# Patient Record
Sex: FEMALE | URBAN - NONMETROPOLITAN AREA
[De-identification: names, ages, dates, MRNs, and addresses within clinical notes are randomized per-mention and may not be internally consistent; named-entity substitution may affect disease eponyms.]

---

## 2017-03-13 ENCOUNTER — APPOINTMENT (RX ONLY)
Dept: URBAN - NONMETROPOLITAN AREA CLINIC 1 | Facility: CLINIC | Age: 47
Setting detail: DERMATOLOGY
End: 2017-03-13

## 2017-03-13 VITALS — HEIGHT: 64 IN | WEIGHT: 140 LBS

## 2017-03-13 DIAGNOSIS — D22 MELANOCYTIC NEVI: ICD-10-CM

## 2017-03-13 DIAGNOSIS — D485 NEOPLASM OF UNCERTAIN BEHAVIOR OF SKIN: ICD-10-CM

## 2017-03-13 DIAGNOSIS — L81.4 OTHER MELANIN HYPERPIGMENTATION: ICD-10-CM

## 2017-03-13 DIAGNOSIS — L82.1 OTHER SEBORRHEIC KERATOSIS: ICD-10-CM

## 2017-03-13 PROBLEM — D22.5 MELANOCYTIC NEVI OF TRUNK: Status: ACTIVE | Noted: 2017-03-13

## 2017-03-13 PROBLEM — D48.5 NEOPLASM OF UNCERTAIN BEHAVIOR OF SKIN: Status: ACTIVE | Noted: 2017-03-13

## 2017-03-13 PROCEDURE — ? COUNSELING

## 2017-03-13 PROCEDURE — ? BIOPSY BY SHAVE METHOD

## 2017-03-13 PROCEDURE — 99203 OFFICE O/P NEW LOW 30 MIN: CPT | Mod: 25

## 2017-03-13 PROCEDURE — 11100: CPT

## 2017-03-13 ASSESSMENT — LOCATION SIMPLE DESCRIPTION DERM
LOCATION SIMPLE: RIGHT UPPER BACK
LOCATION SIMPLE: CHEST
LOCATION SIMPLE: RIGHT LOWER BACK

## 2017-03-13 ASSESSMENT — LOCATION DETAILED DESCRIPTION DERM
LOCATION DETAILED: LEFT LATERAL SUPERIOR CHEST
LOCATION DETAILED: RIGHT MID-UPPER BACK
LOCATION DETAILED: RIGHT INFERIOR LATERAL MIDBACK

## 2017-03-13 ASSESSMENT — LOCATION ZONE DERM: LOCATION ZONE: TRUNK

## 2017-03-13 NOTE — HPI: EVALUATION OF SKIN LESION(S)
How Severe Are Your Spot(S)?: moderate
Have Your Spot(S) Been Treated In The Past?: has not been treated
Hpi Title: Evaluation of Skin Lesions
Family Member: Mother

## 2017-03-13 NOTE — PROCEDURE: BIOPSY BY SHAVE METHOD
Notification Instructions: Patient will be notified of biopsy results. However, patient instructed to call the office if not contacted within 2 weeks.
Detail Level: Detailed
Hemostasis: Drysol
Lab: [id number]
X Size Of Lesion In Cm: 0
Dressing: bandage
Biopsy Method: Jose Juan enriquez
Anesthesia Volume In Cc: 0.5
Wound Care: Bacitracin
Type Of Destruction Used: Curettage
Body Location Override (Optional - Billing Will Still Be Based On Selected Body Map Location If Applicable): Right lateral back
Consent: Written consent was obtained and risks were reviewed including but not limited to scarring, infection, bleeding, scabbing, incomplete removal, nerve damage and allergy to anesthesia.
Bill 47517 For Specimen Handling/Conveyance To Laboratory?: no
Biopsy Type: H and E
Billing Type: Third-Party Bill
Anesthesia Type: 1% lidocaine with epinephrine
Post-Care Instructions: I reviewed with the patient in detail post-care instructions. Patient is to keep the biopsy site dry overnight, and then apply bacitracin twice daily until healed. Patient may apply hydrogen peroxide soaks to remove any crusting.

## 2021-02-23 ENCOUNTER — APPOINTMENT (RX ONLY)
Dept: URBAN - METROPOLITAN AREA CLINIC 38 | Facility: CLINIC | Age: 51
Setting detail: DERMATOLOGY
End: 2021-02-23

## 2021-02-23 DIAGNOSIS — D22 MELANOCYTIC NEVI: ICD-10-CM

## 2021-02-23 DIAGNOSIS — L81.4 OTHER MELANIN HYPERPIGMENTATION: ICD-10-CM

## 2021-02-23 DIAGNOSIS — L57.8 OTHER SKIN CHANGES DUE TO CHRONIC EXPOSURE TO NONIONIZING RADIATION: ICD-10-CM

## 2021-02-23 DIAGNOSIS — Z71.89 OTHER SPECIFIED COUNSELING: ICD-10-CM

## 2021-02-23 DIAGNOSIS — D18.0 HEMANGIOMA: ICD-10-CM

## 2021-02-23 DIAGNOSIS — L82.1 OTHER SEBORRHEIC KERATOSIS: ICD-10-CM

## 2021-02-23 PROBLEM — D22.9 MELANOCYTIC NEVI, UNSPECIFIED: Status: ACTIVE | Noted: 2021-02-23

## 2021-02-23 PROBLEM — D18.01 HEMANGIOMA OF SKIN AND SUBCUTANEOUS TISSUE: Status: ACTIVE | Noted: 2021-02-23

## 2021-02-23 PROCEDURE — 99203 OFFICE O/P NEW LOW 30 MIN: CPT

## 2021-02-23 PROCEDURE — ? PRESCRIPTION

## 2021-02-23 PROCEDURE — ? COUNSELING

## 2021-02-23 RX ORDER — IMIQUIMOD 50 MG/G
CREAM TOPICAL
Qty: 1 | Refills: 3 | Status: ERX | COMMUNITY
Start: 2021-02-23

## 2021-02-23 RX ADMIN — IMIQUIMOD: 50 CREAM TOPICAL at 00:00

## 2021-02-23 ASSESSMENT — LOCATION SIMPLE DESCRIPTION DERM
LOCATION SIMPLE: ABDOMEN
LOCATION SIMPLE: RIGHT UPPER BACK

## 2021-02-23 ASSESSMENT — LOCATION ZONE DERM: LOCATION ZONE: TRUNK

## 2021-02-23 ASSESSMENT — LOCATION DETAILED DESCRIPTION DERM
LOCATION DETAILED: RIGHT INFERIOR UPPER BACK
LOCATION DETAILED: PERIUMBILICAL SKIN

## 2022-08-25 ENCOUNTER — APPOINTMENT (RX ONLY)
Dept: URBAN - METROPOLITAN AREA CLINIC 38 | Facility: CLINIC | Age: 52
Setting detail: DERMATOLOGY
End: 2022-08-25

## 2022-08-25 DIAGNOSIS — L55.9 SUNBURN, UNSPECIFIED: ICD-10-CM

## 2022-08-25 DIAGNOSIS — D18.0 HEMANGIOMA: ICD-10-CM

## 2022-08-25 DIAGNOSIS — L81.4 OTHER MELANIN HYPERPIGMENTATION: ICD-10-CM

## 2022-08-25 DIAGNOSIS — D22 MELANOCYTIC NEVI: ICD-10-CM

## 2022-08-25 DIAGNOSIS — L82.1 OTHER SEBORRHEIC KERATOSIS: ICD-10-CM

## 2022-08-25 DIAGNOSIS — B07.8 OTHER VIRAL WARTS: ICD-10-CM

## 2022-08-25 DIAGNOSIS — Z71.89 OTHER SPECIFIED COUNSELING: ICD-10-CM

## 2022-08-25 PROBLEM — D18.01 HEMANGIOMA OF SKIN AND SUBCUTANEOUS TISSUE: Status: ACTIVE | Noted: 2022-08-25

## 2022-08-25 PROBLEM — D22.9 MELANOCYTIC NEVI, UNSPECIFIED: Status: ACTIVE | Noted: 2022-08-25

## 2022-08-25 PROCEDURE — 99213 OFFICE O/P EST LOW 20 MIN: CPT

## 2022-08-25 PROCEDURE — ? COUNSELING

## 2022-08-25 PROCEDURE — ? PRESCRIPTION

## 2022-08-25 RX ORDER — IMIQUIMOD 12.5 MG/.25G
CREAM TOPICAL
Qty: 24 | Refills: 2 | Status: ERX | COMMUNITY
Start: 2022-08-25

## 2022-08-25 RX ADMIN — IMIQUIMOD: 12.5 CREAM TOPICAL at 00:00

## 2022-08-25 ASSESSMENT — LOCATION DETAILED DESCRIPTION DERM
LOCATION DETAILED: LEFT CLAVICULAR NECK
LOCATION DETAILED: LEFT DISTAL DORSAL FOREARM
LOCATION DETAILED: PERIUMBILICAL SKIN
LOCATION DETAILED: RIGHT MEDIAL TRAPEZIAL NECK
LOCATION DETAILED: RIGHT DISTAL PRETIBIAL REGION
LOCATION DETAILED: INFERIOR MID FOREHEAD
LOCATION DETAILED: RIGHT INFERIOR UPPER BACK
LOCATION DETAILED: RIGHT PROXIMAL DORSAL FOREARM
LOCATION DETAILED: RIGHT PROXIMAL PRETIBIAL REGION

## 2022-08-25 ASSESSMENT — LOCATION ZONE DERM
LOCATION ZONE: TRUNK
LOCATION ZONE: LEG
LOCATION ZONE: FACE
LOCATION ZONE: ARM
LOCATION ZONE: NECK

## 2022-08-25 ASSESSMENT — LOCATION SIMPLE DESCRIPTION DERM
LOCATION SIMPLE: RIGHT UPPER BACK
LOCATION SIMPLE: POSTERIOR NECK
LOCATION SIMPLE: LEFT FOREARM
LOCATION SIMPLE: ABDOMEN
LOCATION SIMPLE: LEFT ANTERIOR NECK
LOCATION SIMPLE: INFERIOR FOREHEAD
LOCATION SIMPLE: RIGHT FOREARM
LOCATION SIMPLE: RIGHT PRETIBIAL REGION

## 2024-01-30 ENCOUNTER — HOSPITAL ENCOUNTER (OUTPATIENT)
Dept: RADIOLOGY | Facility: MEDICAL CENTER | Age: 54
End: 2024-01-30

## 2024-02-06 ENCOUNTER — APPOINTMENT (OUTPATIENT)
Dept: SURGERY | Facility: MEDICAL CENTER | Age: 54
End: 2024-02-06

## 2024-02-08 ENCOUNTER — APPOINTMENT (RX ONLY)
Dept: URBAN - METROPOLITAN AREA CLINIC 6 | Facility: CLINIC | Age: 54
Setting detail: DERMATOLOGY
End: 2024-02-08

## 2024-02-08 DIAGNOSIS — L73.9 FOLLICULAR DISORDER, UNSPECIFIED: ICD-10-CM

## 2024-02-08 DIAGNOSIS — L57.0 ACTINIC KERATOSIS: ICD-10-CM

## 2024-02-08 DIAGNOSIS — B07.8 OTHER VIRAL WARTS: ICD-10-CM

## 2024-02-08 DIAGNOSIS — D22 MELANOCYTIC NEVI: ICD-10-CM

## 2024-02-08 DIAGNOSIS — L738 OTHER SPECIFIED DISEASES OF HAIR AND HAIR FOLLICLES: ICD-10-CM

## 2024-02-08 DIAGNOSIS — L663 OTHER SPECIFIED DISEASES OF HAIR AND HAIR FOLLICLES: ICD-10-CM

## 2024-02-08 DIAGNOSIS — L81.4 OTHER MELANIN HYPERPIGMENTATION: ICD-10-CM

## 2024-02-08 DIAGNOSIS — L82.1 OTHER SEBORRHEIC KERATOSIS: ICD-10-CM

## 2024-02-08 DIAGNOSIS — Z71.89 OTHER SPECIFIED COUNSELING: ICD-10-CM

## 2024-02-08 DIAGNOSIS — D18.0 HEMANGIOMA: ICD-10-CM

## 2024-02-08 PROBLEM — D22.5 MELANOCYTIC NEVI OF TRUNK: Status: ACTIVE | Noted: 2024-02-08

## 2024-02-08 PROBLEM — L02.223 FURUNCLE OF CHEST WALL: Status: ACTIVE | Noted: 2024-02-08

## 2024-02-08 PROBLEM — D18.01 HEMANGIOMA OF SKIN AND SUBCUTANEOUS TISSUE: Status: ACTIVE | Noted: 2024-02-08

## 2024-02-08 PROBLEM — D22.71 MELANOCYTIC NEVI OF RIGHT LOWER LIMB, INCLUDING HIP: Status: ACTIVE | Noted: 2024-02-08

## 2024-02-08 PROCEDURE — 99213 OFFICE O/P EST LOW 20 MIN: CPT | Mod: 25

## 2024-02-08 PROCEDURE — 17000 DESTRUCT PREMALG LESION: CPT

## 2024-02-08 PROCEDURE — 17003 DESTRUCT PREMALG LES 2-14: CPT

## 2024-02-08 PROCEDURE — ? PHOTO-DOCUMENTATION

## 2024-02-08 PROCEDURE — ? LIQUID NITROGEN

## 2024-02-08 PROCEDURE — ? COUNSELING

## 2024-02-08 PROCEDURE — ? ADDITIONAL NOTES

## 2024-02-08 PROCEDURE — ? SUNSCREEN RECOMMENDATIONS

## 2024-02-08 ASSESSMENT — LOCATION DETAILED DESCRIPTION DERM
LOCATION DETAILED: RIGHT INFERIOR CENTRAL MALAR CHEEK
LOCATION DETAILED: NASAL DORSUM
LOCATION DETAILED: RIGHT INFERIOR UPPER BACK
LOCATION DETAILED: SUPERIOR THORACIC SPINE
LOCATION DETAILED: INFERIOR THORACIC SPINE
LOCATION DETAILED: LEFT SUPERIOR MEDIAL UPPER BACK
LOCATION DETAILED: LEFT SUPERIOR FOREHEAD
LOCATION DETAILED: RIGHT PROXIMAL PRETIBIAL REGION
LOCATION DETAILED: NASAL SUPRATIP
LOCATION DETAILED: RIGHT MEDIAL PLANTAR MIDFOOT
LOCATION DETAILED: RIGHT DISTAL CALF
LOCATION DETAILED: RIGHT LATERAL SUPERIOR CHEST
LOCATION DETAILED: UPPER STERNUM

## 2024-02-08 ASSESSMENT — LOCATION SIMPLE DESCRIPTION DERM
LOCATION SIMPLE: LEFT FOREHEAD
LOCATION SIMPLE: LEFT UPPER BACK
LOCATION SIMPLE: RIGHT PRETIBIAL REGION
LOCATION SIMPLE: RIGHT CALF
LOCATION SIMPLE: NOSE
LOCATION SIMPLE: RIGHT CHEEK
LOCATION SIMPLE: CHEST
LOCATION SIMPLE: UPPER BACK
LOCATION SIMPLE: RIGHT UPPER BACK
LOCATION SIMPLE: RIGHT PLANTAR SURFACE

## 2024-02-08 ASSESSMENT — LOCATION ZONE DERM
LOCATION ZONE: LEG
LOCATION ZONE: TRUNK
LOCATION ZONE: NOSE
LOCATION ZONE: FACE
LOCATION ZONE: FEET

## 2024-02-08 NOTE — PROCEDURE: ADDITIONAL NOTES
Render Risk Assessment In Note?: no
Detail Level: Detailed
Additional Notes: Recommended CeraVe SA Cream
Additional Notes: Recommended Hydrocortisone, if lack of improvement will proceed with Clindamycin Solution.

## 2024-02-08 NOTE — PROCEDURE: LIQUID NITROGEN
Consent: The patient's consent was obtained including but not limited to risks of crusting, scabbing, blistering, scarring, darker or lighter pigmentary change, recurrence, incomplete removal and infection.
Show Aperture Variable?: Yes
Detail Level: Zone
Number Of Freeze-Thaw Cycles: 2 freeze-thaw cycles
Render Post-Care Instructions In Note?: no
Post-Care Instructions: I reviewed with the patient in detail post-care instructions. Patient is to wear sunprotection, and avoid picking at any of the treated lesions. Pt may apply Vaseline to crusted or scabbing areas.
Duration Of Freeze Thaw-Cycle (Seconds): 8

## 2024-02-08 NOTE — PROCEDURE: PHOTO-DOCUMENTATION
Photo Preface (Leave Blank If You Do Not Want): Photographs were obtained today
Details (Free Text): Captured clinical photos of nevus on Right superior chest, will plan on rechecking at follow up visit.
Detail Level: Detailed

## 2024-03-05 ENCOUNTER — APPOINTMENT (OUTPATIENT)
Dept: SURGERY | Facility: MEDICAL CENTER | Age: 54
End: 2024-03-05

## 2024-03-22 NOTE — PROGRESS NOTES
Shanika Christine is a 53 y.o. female who presents for evaluation of right breast papilloma.  She reports intermittent right breast pain.    Routine self breast exams: {YES/NO:20266}  Breast pain: Yes  Nipple discharge: {YES/NO:20266}  Skin changes: {YES/NO:20266}  Masses: {YES/NO:20266}  Contour/nipple changes: {YES/NO:20266}  Previous breast biopsy or surgery: Yes; Prior Lumpectomy (few years ago) for previous papilloma    Age at menarche: ***  Age at menopause: ***  Age at first birth: ***  G*** P***  Hormone replacement therapy: {YES/NO:20266}    Family history of cancer: ***    Lifetime (5-yr) breast cancer risk assessment calculation  Kristin-Kailyn v7: *** % (*** %)  Kristin-Kailyn v8: *** % (*** %)  Verónica model: *** % (*** %)    Imaging  Screening mammogram (RD) 01/18/2024: Left breast no suspicious findings. Right breast retreoareaolar cysts present. Density C.   *** ultrasound (***) ***: ***     Pathology  ***    Past Medical History   No past medical history on file.    Surgical History  No past surgical history on file.    Family History  No family history on file.    Social History  Social History     Socioeconomic History    Marital status: Not on file     Spouse name: Not on file    Number of children: Not on file    Years of education: Not on file    Highest education level: Not on file   Occupational History    Not on file   Tobacco Use    Smoking status: Not on file    Smokeless tobacco: Not on file   Substance and Sexual Activity    Alcohol use: Not on file    Drug use: Not on file    Sexual activity: Not on file   Other Topics Concern    Not on file   Social History Narrative    Not on file     Social Determinants of Health     Financial Resource Strain: Not on file   Food Insecurity: Not on file   Transportation Needs: Not on file   Physical Activity: Not on file   Stress: Not on file   Social Connections: Not on file   Intimate Partner Violence: Not on file   Housing Stability: Not on  file        Review of Systems  Review of Systems - Oncology     Objective   There were no vitals taken for this visit.   Physical Exam    Assessment & Plan   The patient is a delightful 53 y.o. female with a recurrent diagnosis of intraductal papilloma.     We discussed surgical options, including excisional biopsy of the right breast. In her particular case, I recommend proceeding with ***.   All questions answered in detail.  A thorough discussion was held with the patient of the indications, alternatives, risks (including lymphedema, positive margins, bleeding, infection, anesthetic complications, and the potential need for more than one operation), as well as the expected outcomes.  She would like to proceed with ***.     Before proceeding with ***, she will need ***.     We discussed the option of genetic counseling and genetic testing.  She *** NCCN criteria for genetic testing due to ***.  At this point the patient is *** in genetic counseling and testing.     A total of *** minutes were spent on and with this patient today, including review of records, independent review of imaging, history and physical exam, counseling, documentation of exam, and coordination of care.

## 2024-03-25 ENCOUNTER — HOSPITAL ENCOUNTER (OUTPATIENT)
Dept: RADIOLOGY | Facility: MEDICAL CENTER | Age: 54
End: 2024-03-25
Attending: NURSE PRACTITIONER

## 2024-03-25 ENCOUNTER — HOSPITAL ENCOUNTER (OUTPATIENT)
Dept: RADIOLOGY | Facility: MEDICAL CENTER | Age: 54
End: 2024-03-25

## 2024-03-25 ENCOUNTER — HOSPITAL ENCOUNTER (OUTPATIENT)
Dept: RADIOLOGY | Facility: MEDICAL CENTER | Age: 54
End: 2024-03-25
Attending: PHYSICIAN ASSISTANT

## 2024-04-16 ENCOUNTER — TELEPHONE (OUTPATIENT)
Dept: SURGERY | Facility: MEDICAL CENTER | Age: 54
End: 2024-04-16
Payer: COMMERCIAL

## 2024-04-16 NOTE — TELEPHONE ENCOUNTER
Patient called to cancel 4/18/24 new patient appointment with Dr. Merino. She said she doesn't have the money for her appointment as she had to spend it; on her sick dog. Patient did not want to reschedule appointment. Patient has rescheduled multiple times. I will notify referring office.

## 2024-04-18 ENCOUNTER — APPOINTMENT (OUTPATIENT)
Dept: SURGERY | Facility: MEDICAL CENTER | Age: 54
End: 2024-04-18

## 2024-11-20 ENCOUNTER — APPOINTMENT (RX ONLY)
Dept: URBAN - METROPOLITAN AREA CLINIC 6 | Facility: CLINIC | Age: 54
Setting detail: DERMATOLOGY
End: 2024-11-20

## 2024-11-20 DIAGNOSIS — B07.8 OTHER VIRAL WARTS: ICD-10-CM | Status: INADEQUATELY CONTROLLED

## 2024-11-20 DIAGNOSIS — L82.1 OTHER SEBORRHEIC KERATOSIS: ICD-10-CM

## 2024-11-20 DIAGNOSIS — L57.0 ACTINIC KERATOSIS: ICD-10-CM

## 2024-11-20 PROCEDURE — 99212 OFFICE O/P EST SF 10 MIN: CPT | Mod: 25

## 2024-11-20 PROCEDURE — 17003 DESTRUCT PREMALG LES 2-14: CPT | Mod: 59

## 2024-11-20 PROCEDURE — ? LIQUID NITROGEN

## 2024-11-20 PROCEDURE — ? COUNSELING

## 2024-11-20 PROCEDURE — 17000 DESTRUCT PREMALG LESION: CPT | Mod: 59

## 2024-11-20 PROCEDURE — 17110 DESTRUCTION B9 LES UP TO 14: CPT

## 2024-11-20 ASSESSMENT — LOCATION SIMPLE DESCRIPTION DERM
LOCATION SIMPLE: LEFT LOWER BACK
LOCATION SIMPLE: NOSE
LOCATION SIMPLE: RIGHT UPPER BACK
LOCATION SIMPLE: LEFT UPPER BACK
LOCATION SIMPLE: ABDOMEN
LOCATION SIMPLE: LEFT SCALP
LOCATION SIMPLE: RIGHT CHEEK

## 2024-11-20 ASSESSMENT — LOCATION ZONE DERM
LOCATION ZONE: SCALP
LOCATION ZONE: TRUNK
LOCATION ZONE: NOSE
LOCATION ZONE: FACE

## 2024-11-20 ASSESSMENT — LOCATION DETAILED DESCRIPTION DERM
LOCATION DETAILED: RIGHT SUPERIOR CENTRAL MALAR CHEEK
LOCATION DETAILED: LEFT INFERIOR LATERAL MIDBACK
LOCATION DETAILED: LEFT MEDIAL UPPER BACK
LOCATION DETAILED: LEFT MEDIAL FRONTAL SCALP
LOCATION DETAILED: RIGHT INFERIOR UPPER BACK
LOCATION DETAILED: LEFT RIB CAGE
LOCATION DETAILED: LEFT SUPERIOR LATERAL MIDBACK
LOCATION DETAILED: LEFT LATERAL ABDOMEN
LOCATION DETAILED: NASAL SUPRATIP
LOCATION DETAILED: PERIUMBILICAL SKIN
LOCATION DETAILED: RIGHT LATERAL ABDOMEN

## 2024-11-20 NOTE — PROCEDURE: LIQUID NITROGEN
Render Note In Bullet Format When Appropriate: No
Show Applicator Variable?: Yes
Number Of Freeze-Thaw Cycles: 2 freeze-thaw cycles
Duration Of Freeze Thaw-Cycle (Seconds): 8
Consent: The patient's verbal consent was obtained including but not limited to risks of crusting, scabbing, blistering, scarring, darker or lighter pigmentary change, recurrence, incomplete removal and infection.
Detail Level: Zone
Post-Care Instructions: I reviewed with the patient in detail post-care instructions. Patient is to wear sunprotection, and avoid picking at any of the treated lesions. Pt may apply Vaseline to crusted or scabbing areas.
Spray Paint Text: The liquid nitrogen was applied to the skin utilizing a spray paint frosting technique.
Consent: The patient's consent was obtained including but not limited to risks of crusting, scabbing, blistering, scarring, darker or lighter pigmentary change, recurrence, incomplete removal and infection.
Application Tool (Optional): Liquid Nitrogen Sprayer
Medical Necessity Information: It is in your best interest to select a reason for this procedure from the list below. All of these items fulfill various CMS LCD requirements except the new and changing color options.
Medical Necessity Clause: This procedure was medically necessary because the lesions that were treated were:

## 2025-02-04 ENCOUNTER — TELEPHONE (OUTPATIENT)
Dept: SURGERY | Facility: MEDICAL CENTER | Age: 55
End: 2025-02-04
Payer: COMMERCIAL

## 2025-02-04 NOTE — TELEPHONE ENCOUNTER
Referral received from JORDAN Holman. 1/22 KJ left a voicemail for patient to call back to schedule appointment. I left a voicemail today for patient to call back to schedule appointment with one of our providers.

## 2025-02-12 ENCOUNTER — TELEPHONE (OUTPATIENT)
Dept: SURGERY | Facility: MEDICAL CENTER | Age: 55
End: 2025-02-12
Payer: COMMERCIAL

## 2025-02-12 NOTE — TELEPHONE ENCOUNTER
DANY for pt to schedule a new pt consult with one of our breast surgeons. Last attempt to contact pt.     George

## 2025-02-19 ENCOUNTER — HOSPITAL ENCOUNTER (OUTPATIENT)
Dept: RADIOLOGY | Facility: MEDICAL CENTER | Age: 55
End: 2025-02-19
Attending: PHYSICIAN ASSISTANT
Payer: COMMERCIAL

## 2025-02-19 PROBLEM — D24.1 PAPILLOMA OF RIGHT BREAST: Status: ACTIVE | Noted: 2018-05-14

## 2025-02-19 PROBLEM — M25.559 ARTHRALGIA OF HIP: Status: ACTIVE | Noted: 2021-06-01

## 2025-02-19 PROBLEM — K59.00 CONSTIPATION: Status: ACTIVE | Noted: 2017-03-08

## 2025-02-19 PROBLEM — E55.9 VITAMIN D DEFICIENCY: Status: ACTIVE | Noted: 2019-03-28

## 2025-02-19 PROBLEM — L25.5 CONTACT DERMATITIS DUE TO PLANT: Status: ACTIVE | Noted: 2020-07-15

## 2025-02-19 PROBLEM — I78.1 NEVUS, NON-NEOPLASTIC: Status: ACTIVE | Noted: 2020-07-15

## 2025-02-19 PROBLEM — R92.30 DENSE BREASTS: Status: ACTIVE | Noted: 2017-03-08

## 2025-02-19 PROBLEM — M67.439 GANGLION OF WRIST: Status: ACTIVE | Noted: 2020-07-15

## 2025-02-21 ENCOUNTER — OFFICE VISIT (OUTPATIENT)
Dept: SURGERY | Facility: MEDICAL CENTER | Age: 55
End: 2025-02-21
Payer: COMMERCIAL

## 2025-02-21 VITALS
TEMPERATURE: 96.6 F | BODY MASS INDEX: 26.63 KG/M2 | DIASTOLIC BLOOD PRESSURE: 74 MMHG | WEIGHT: 156 LBS | OXYGEN SATURATION: 96 % | HEIGHT: 64 IN | SYSTOLIC BLOOD PRESSURE: 130 MMHG | HEART RATE: 77 BPM

## 2025-02-21 DIAGNOSIS — N64.52 BLOODY DISCHARGE FROM RIGHT NIPPLE: ICD-10-CM

## 2025-02-21 PROCEDURE — 3075F SYST BP GE 130 - 139MM HG: CPT | Performed by: SURGERY

## 2025-02-21 PROCEDURE — 99204 OFFICE O/P NEW MOD 45 MIN: CPT | Performed by: SURGERY

## 2025-02-21 PROCEDURE — 3078F DIAST BP <80 MM HG: CPT | Performed by: SURGERY

## 2025-02-21 ASSESSMENT — ENCOUNTER SYMPTOMS
SLEEP DISTURBANCE: 1
NECK PAIN: 1
DEPRESSION: 1
ARTHRALGIAS: 1

## 2025-02-21 NOTE — PROGRESS NOTES
Subjective     Dinah Christine is a 54 y.o. female who presents for evaluation of right bloody nipple discharge.  She reports that it has been ongoing for years, with increasing masslike density in the right lateral breast and pain/tenderness that is also worse around her periods.    Routine self breast exams: Yes  Breast pain: Yes  Nipple discharge: Yes  Skin changes: No   Masses: Yes  Contour/nipple changes: No   Previous breast biopsy or surgery: Yes (right breast UOQ excisional biopsy ~2017 ([facility]))    Age at menarche: 16  Age at menopause: pre/perimenopausal  Age at first birth: N/A    Hormone replacement therapy: Yes (combined HRT x2 weeks, ongoing)    Family history of cancer: Mother melanoma (55), breast cancer (76). MGM breast cancer (97)    Lifetime (5-yr) breast cancer risk calculations  Tyrer-Cuzick v7: 19.77% (2.97%)  Lucaser-Srinathzick v8: 19.52% (2.95%)  Verónica model: 14.25% (1.79%)    Imaging  - Bilateral screening mammogram ([facility]) 2025: No masses, distortion, or suspicious calcifications. BIRADS 2, density C.  - Bilateral breast MRI ([facility]) 01/10/2025: Right breast cysts, no suspicious findings. BIRADS 2.  All imaging personally reviewed (internal and external images).    Past Medical History   History reviewed. No pertinent past medical history.    Surgical History  Past Surgical History:   Procedure Laterality Date    LUMPECTOMY Right        Family History  History reviewed. No pertinent family history.    Social History  Social History     Socioeconomic History    Marital status: Single     Spouse name: Not on file    Number of children: Not on file    Years of education: Not on file    Highest education level: Not on file   Occupational History    Not on file   Tobacco Use    Smoking status: Former     Types: Cigarettes    Smokeless tobacco: Never   Vaping Use    Vaping status: Never Used   Substance and Sexual Activity    Alcohol use: Not Currently    Drug use: Not Currently    Sexual  "activity: Not on file   Other Topics Concern    Not on file   Social History Narrative    Not on file     Social Drivers of Health     Financial Resource Strain: Not on File (2019)    Received from [facility]    Financial Resource Strain     Financial Resource Strain: 0   Food Insecurity: Not on File (2019)    Received from [facility]    Food Insecurity     Food: 0   Transportation Needs: Not on File (2019)    Received from [facility]    Transportation Needs     Transportation: 0   Physical Activity: Not on File (2019)    Received from [facility]    Physical Activity     Physical Activity: 0   Stress: Not on File (2019)    Received from [facility]    Stress     Stress: 0   Social Connections: Not on File (2019)    Received from [facility]    Social Connections     Social Connections and Isolation: 0   Intimate Partner Violence: Not on file   Housing Stability: Not on File (2019)    Received from [facility]    Housing Stability     Housin       Review of Systems  Review of Systems   Musculoskeletal:  Positive for arthralgias and neck pain.   Psychiatric/Behavioral:  Positive for depression and sleep disturbance.    All other systems reviewed and are negative.       Objective   /74 (BP Location: Left arm, Patient Position: Sitting, BP Cuff Size: Large adult)   Pulse 77   Temp 35.9 °C (96.6 °F) (Temporal)   Ht 1.626 m (5' 4\")   Wt 70.8 kg (156 lb)   SpO2 96%   BMI 26.78 kg/m²    Physical Exam  Vitals and nursing note reviewed.   Constitutional:       General: She is not in acute distress.     Appearance: Normal appearance.   HENT:      Head: Normocephalic and atraumatic.      Right Ear: External ear normal.      Left Ear: External ear normal.      Nose: Nose normal.      Mouth/Throat:      Pharynx: Oropharynx is clear.   Eyes:      General: No scleral icterus.     Conjunctiva/sclera: Conjunctivae normal.   Cardiovascular:      Rate and Rhythm: Normal rate " and regular rhythm.      Heart sounds: Normal heart sounds. No murmur heard.     No friction rub. No gallop.   Pulmonary:      Effort: Pulmonary effort is normal. No respiratory distress.      Breath sounds: Normal breath sounds. No wheezing, rhonchi or rales.   Chest:   Breasts:     Poli Score is 5.      Breasts are symmetrical.      Right: Mass and nipple discharge present. No swelling, bleeding, inverted nipple or skin change.      Left: Normal. No swelling, bleeding, inverted nipple, mass, nipple discharge or skin change.          Comments: Bilateral breasts examined in the upright and supine positions.  No suspicious skin changes (erythema, peau d'orange).  No unexpected contour abnormalities.  Well-healed right UOQ curvilinear incision from prior excision.  Bilateral breast tissue heterogeneously dense with dominant very firm masslike nodular densities through the right breast 09:00 radian.  Bilateral nipples everted with a tiny droplet of clear-appearing fluid from a single duct on the right nipple.  No palpable cervical, supraclavicular, or axillary adenopathy bilaterally.  Bedside ultrasound performed with the GE 12mHz linear probe confirming extensive cystic changes through the 09:00 radian with the larger apparent cysts containing immobile intraluminal solid masses.  Abdominal:      General: Abdomen is flat. There is no distension.      Palpations: Abdomen is soft. There is no mass.   Musculoskeletal:         General: No swelling or deformity. Normal range of motion.      Cervical back: Neck supple.   Lymphadenopathy:      Cervical: No cervical adenopathy.      Upper Body:      Right upper body: No supraclavicular or axillary adenopathy.      Left upper body: No supraclavicular or axillary adenopathy.   Skin:     General: Skin is warm and dry.      Capillary Refill: Capillary refill takes less than 2 seconds.   Neurological:      General: No focal deficit present.      Mental Status: She is alert and  oriented to person, place, and time.   Psychiatric:         Mood and Affect: Mood normal.         Behavior: Behavior normal.         Thought Content: Thought content normal.         Judgment: Judgment normal.       [facility] ECOG Performance Status categories: 0= Fully active, able to carry on all pre-disease performance without restriction.  FUNCTIONAL ASSESSMENT  Patient responses to questions:  - Have you ever had shoulder surgery or been treated for a shoulder injury that resulted in a loss of range of motion?  No   - Are you unable to reach into an upper cabinet and retrieve a cup or plate?  No   - Do you have any limitations in daily activities because of your shoulder?  No   Overhead raise test for shoulder flexion:  Normal Range:  150-180 degrees    Assessment & Plan   The patient is a delightful 54 y.o. female with symptomatic right nipple discharge and benign imaging.  We discussed that given the sensitivity of MRI, the likelihood of missed malignancy is very low, but her ongoing symptoms warrant excision of this clearly abnormal tissue as a therapeutic and diagnostic intervention.  A thorough discussion was held with the patient of the indications, alternatives, risks (including bleeding, infection, anesthetic complications, and the potential need for more than one operation), as well as the expected outcomes.  After this discussion with shared decision-making, the patient would like to proceed with surgery scheduling for right breast excisional biopsy (likely with intraoperative ultrasound-guided wire bracketing).      The patient has the following potential barriers to care: Financial concerns. We will connect her with the appropriate financial department.    We discussed postoperative pain and that we will utilize a multimodal approach to pain control that may include preoperative medications given before surgery, intraoperative nerve blocks and local anesthetic, nonopiate pain medications during and after  surgery, and postoperative nonopiate pain medications including antiinflammatory medication and acetaminophen.  If these measures are inadequate to control her pain, opiate medications may be used on a short-term basis to aid in postoperative recovery and mobilization.  The patient does not have a history of chronic opiate use or substance abuse and has been educated about avoiding use of controlled substances with alcohol, marijuana, and/or illicit substances.  A drug utilization report will be fully reviewed prior to providing a prescription for any controlled substance if that prescription is deemed necessary.     A total of 55 minutes were spent on and with this patient today, including review of records, independent review of imaging, history and physical exam, counseling, documentation of exam, and coordination of care.

## 2025-02-26 ENCOUNTER — DOCUMENTATION (OUTPATIENT)
Dept: SURGERY | Facility: MEDICAL CENTER | Age: 55
End: 2025-02-26
Payer: COMMERCIAL

## 2025-03-10 ENCOUNTER — PRE-ADMISSION TESTING (OUTPATIENT)
Dept: ADMISSIONS | Facility: MEDICAL CENTER | Age: 55
End: 2025-03-10
Attending: SURGERY
Payer: COMMERCIAL

## 2025-03-10 NOTE — PREADMIT AVS NOTE
Current Medications   Medication Instructions    Progesterone 40 % Cream Hold medication day of procedure    Calcium-Vitamin D-Vitamin K 500-100-40 MG-UNT-MCG Chew Tab Stop 7 days before surgery

## 2025-03-19 ENCOUNTER — APPOINTMENT (OUTPATIENT)
Dept: ADMISSIONS | Facility: MEDICAL CENTER | Age: 55
End: 2025-03-19
Attending: SURGERY
Payer: COMMERCIAL

## 2025-03-19 DIAGNOSIS — Z01.812 PRE-OPERATIVE LABORATORY EXAMINATION: ICD-10-CM

## 2025-03-19 LAB
ERYTHROCYTE [DISTWIDTH] IN BLOOD BY AUTOMATED COUNT: 45 FL (ref 35.9–50)
HCT VFR BLD AUTO: 43.4 % (ref 37–47)
HGB BLD-MCNC: 14.3 G/DL (ref 12–16)
MCH RBC QN AUTO: 30.9 PG (ref 27–33)
MCHC RBC AUTO-ENTMCNC: 32.9 G/DL (ref 32.2–35.5)
MCV RBC AUTO: 93.7 FL (ref 81.4–97.8)
PLATELET # BLD AUTO: 336 K/UL (ref 164–446)
PMV BLD AUTO: 8.8 FL (ref 9–12.9)
RBC # BLD AUTO: 4.63 M/UL (ref 4.2–5.4)
WBC # BLD AUTO: 6.4 K/UL (ref 4.8–10.8)

## 2025-03-19 PROCEDURE — 85027 COMPLETE CBC AUTOMATED: CPT

## 2025-03-19 PROCEDURE — 36415 COLL VENOUS BLD VENIPUNCTURE: CPT

## 2025-03-31 ENCOUNTER — OFFICE VISIT (OUTPATIENT)
Dept: SURGERY | Facility: MEDICAL CENTER | Age: 55
End: 2025-03-31
Payer: COMMERCIAL

## 2025-03-31 VITALS
OXYGEN SATURATION: 96 % | HEART RATE: 66 BPM | WEIGHT: 158 LBS | SYSTOLIC BLOOD PRESSURE: 133 MMHG | DIASTOLIC BLOOD PRESSURE: 77 MMHG | HEIGHT: 64 IN | TEMPERATURE: 96.4 F | BODY MASS INDEX: 26.98 KG/M2

## 2025-03-31 DIAGNOSIS — N64.52 BLOODY DISCHARGE FROM RIGHT NIPPLE: ICD-10-CM

## 2025-03-31 ASSESSMENT — ENCOUNTER SYMPTOMS
CONSTITUTIONAL NEGATIVE: 1
GASTROINTESTINAL NEGATIVE: 1
RESPIRATORY NEGATIVE: 1
EYES NEGATIVE: 1
PSYCHIATRIC NEGATIVE: 1
MUSCULOSKELETAL NEGATIVE: 1
CARDIOVASCULAR NEGATIVE: 1
NEUROLOGICAL NEGATIVE: 1

## 2025-03-31 NOTE — PROGRESS NOTES
"         SUBJECTIVE:   Dinah Christine is a delightful 54 y.o. female who presents for pre-op visit and H&P update for known Right nipple discharge. She will be undergoing a Right excisional biopsy on 04/03/2025. Currently, she reports no new breast symptoms or changes to her health since last visit. There are no changes in her functional status and she is overall doing well.      Review of Systems   Constitutional: Negative.    HENT: Negative.     Eyes: Negative.    Respiratory: Negative.     Cardiovascular: Negative.    Gastrointestinal: Negative.    Genitourinary: Negative.    Musculoskeletal: Negative.    Skin: Negative.    Neurological: Negative.    Endo/Heme/Allergies: Negative.    Psychiatric/Behavioral: Negative.          OBJECTIVE:  /77 (BP Location: Left arm, Patient Position: Sitting, BP Cuff Size: Large adult)   Pulse 66   Temp (!) 35.8 °C (96.4 °F) (Temporal)   Ht 1.626 m (5' 4\")   Wt 71.7 kg (158 lb)   LMP 02/03/2025 (Approximate)   SpO2 96%   BMI 27.12 kg/m²    General: Alert, oriented, pleasant, no acute distress.  HEENT: Extraocular movements intact, no scleral icterus or conjunctival injection.  Lung: Respirations unlabored on room air. Lung sounds clear in all fields.  Cardio: Normal rate and rhythm. Heart sounds normal.  Abdomen: Soft, nondistended.  Extremities: Warm, well-perfused.  Breasts: Bilateral breasts examined in the upright and supine positions. No suspicious skin findings on either side (erythema, peau d'orange).  Bilateral breasts are heterogeneously dense with multiple dominant firm mass-like nodular densities in the right breast 9:00 position. Bilateral nipples everted without expressible discharge.  No unexpected contour abnormalities. No palpable cervical, supraclavicular, or axillary adenopathy.     FUNCTIONAL ASSESSMENT  Patient responses to questions:    Have you ever had shoulder surgery or been treated for a shoulder injury that resulted in a loss of " range of motion?  No     Are you unable to reach into an upper cabinet and retrieve a cup or plate?  No     Do you have any limitations in daily activities because of your shoulder?  No     Overhead raise test for shoulder flexion:  Normal Range:  150-180 degrees     [facility] ECOG Performance Status categories: 0= Fully active, able to carry on all pre-disease performance without restriction.    ASSESSMENT AND PLAN:  Delightful 54 y.o. female, here for pre-op visit.   Currently she is doing well, without any changes to her medical record. We discussed the plan for Right excisional biopsy. She agrees and elects to proceed with surgery as scheduled.  Pre-op labs and EKG reviewed. We discussed in depth surgical and postoperative expectations, including activity restrictions, dressing management, pain management, etc. Post-operative instructions provided at the consultation with an expectation of additional instructions day of surgery. She is aware to be NPO after midnight the night prior to surgery, aside from 16oz of water 3 hours prior, and stop necessary medications according to preop recommendations. We discussed that her pathology results will be available via [facility], however, Dr. Merino will review the results in detail at her postoperative appointment and answer any questions regarding the results at that time. We discussed that alternating Tylenol and Ibuprofen every 3-6 hours should be sufficient for pain management. All questions have been answered in detail.     Post-op visit: 04/10/2025 at 11:40am      Problem   Bloody Discharge From Right Nipple    Right bloody nipple discharge  - BIRADS 2 MRI 01/2025, sMMG 02/2025  - Extensive cystic change through the right lateral breast with apparent intraluminal solid masses  - Right Excisional biopsy 04/03/2025 ([facility])          Cancer Staging   No matching staging information was found for the patient.       External imaging and reports were independently reviewed.  I  spent 30 minutes today preparing to see the patient (including chart preparation and review), obtaining and reviewing additional medical history, performing a physical exam and evaluation, documenting clinical information in the electronic health record, independently interpreting results, communicating results to the patient, and coordinating care.     Reyna oWrley PA-C

## 2025-04-03 ENCOUNTER — APPOINTMENT (OUTPATIENT)
Dept: RADIOLOGY | Facility: MEDICAL CENTER | Age: 55
End: 2025-04-03
Attending: SURGERY
Payer: COMMERCIAL

## 2025-04-03 ENCOUNTER — ANESTHESIA EVENT (OUTPATIENT)
Dept: SURGERY | Facility: MEDICAL CENTER | Age: 55
End: 2025-04-03
Payer: COMMERCIAL

## 2025-04-03 ENCOUNTER — ANESTHESIA (OUTPATIENT)
Dept: SURGERY | Facility: MEDICAL CENTER | Age: 55
End: 2025-04-03
Payer: COMMERCIAL

## 2025-04-03 ENCOUNTER — PHARMACY VISIT (OUTPATIENT)
Dept: PHARMACY | Facility: MEDICAL CENTER | Age: 55
End: 2025-04-03
Payer: COMMERCIAL

## 2025-04-03 ENCOUNTER — HOSPITAL ENCOUNTER (OUTPATIENT)
Facility: MEDICAL CENTER | Age: 55
End: 2025-04-03
Attending: SURGERY | Admitting: SURGERY
Payer: COMMERCIAL

## 2025-04-03 VITALS
BODY MASS INDEX: 25.93 KG/M2 | TEMPERATURE: 97.6 F | WEIGHT: 151.9 LBS | SYSTOLIC BLOOD PRESSURE: 134 MMHG | OXYGEN SATURATION: 99 % | DIASTOLIC BLOOD PRESSURE: 60 MMHG | HEIGHT: 64 IN | RESPIRATION RATE: 20 BRPM | HEART RATE: 66 BPM

## 2025-04-03 DIAGNOSIS — G89.18 POSTOPERATIVE PAIN: ICD-10-CM

## 2025-04-03 DIAGNOSIS — N64.52 NIPPLE DISCHARGE: ICD-10-CM

## 2025-04-03 LAB
HCG UR QL: NEGATIVE
PATHOLOGY CONSULT NOTE: NORMAL

## 2025-04-03 PROCEDURE — 160048 HCHG OR STATISTICAL LEVEL 1-5: Performed by: SURGERY

## 2025-04-03 PROCEDURE — 700101 HCHG RX REV CODE 250: Performed by: ANESTHESIOLOGY

## 2025-04-03 PROCEDURE — 76098 X-RAY EXAM SURGICAL SPECIMEN: CPT | Mod: AS,26

## 2025-04-03 PROCEDURE — 19125 EXCISION BREAST LESION: CPT | Mod: AS,RT

## 2025-04-03 PROCEDURE — 76098 X-RAY EXAM SURGICAL SPECIMEN: CPT | Mod: RT

## 2025-04-03 PROCEDURE — 160035 HCHG PACU - 1ST 60 MINS PHASE I: Performed by: SURGERY

## 2025-04-03 PROCEDURE — 160028 HCHG SURGERY MINUTES - 1ST 30 MINS LEVEL 3: Performed by: SURGERY

## 2025-04-03 PROCEDURE — 19286 PERQ DEV BREAST ADD US IMAG: CPT | Mod: AS,RT

## 2025-04-03 PROCEDURE — 160015 HCHG STAT PREOP MINUTES: Performed by: SURGERY

## 2025-04-03 PROCEDURE — 76098 X-RAY EXAM SURGICAL SPECIMEN: CPT | Mod: 26 | Performed by: SURGERY

## 2025-04-03 PROCEDURE — 19286 PERQ DEV BREAST ADD US IMAG: CPT | Mod: RT | Performed by: SURGERY

## 2025-04-03 PROCEDURE — 700105 HCHG RX REV CODE 258: Performed by: SURGERY

## 2025-04-03 PROCEDURE — 81025 URINE PREGNANCY TEST: CPT

## 2025-04-03 PROCEDURE — 700111 HCHG RX REV CODE 636 W/ 250 OVERRIDE (IP): Performed by: ANESTHESIOLOGY

## 2025-04-03 PROCEDURE — 160002 HCHG RECOVERY MINUTES (STAT): Performed by: SURGERY

## 2025-04-03 PROCEDURE — 160009 HCHG ANES TIME/MIN: Performed by: SURGERY

## 2025-04-03 PROCEDURE — 88307 TISSUE EXAM BY PATHOLOGIST: CPT | Mod: 26 | Performed by: PATHOLOGY

## 2025-04-03 PROCEDURE — 160025 RECOVERY II MINUTES (STATS): Performed by: SURGERY

## 2025-04-03 PROCEDURE — A4648 IMPLANTABLE TISSUE MARKER: HCPCS | Performed by: SURGERY

## 2025-04-03 PROCEDURE — 19125 EXCISION BREAST LESION: CPT | Mod: RT | Performed by: SURGERY

## 2025-04-03 PROCEDURE — 19285 PERQ DEV BREAST 1ST US IMAG: CPT | Mod: AS,RT

## 2025-04-03 PROCEDURE — 88307 TISSUE EXAM BY PATHOLOGIST: CPT | Performed by: PATHOLOGY

## 2025-04-03 PROCEDURE — RXMED WILLOW AMBULATORY MEDICATION CHARGE

## 2025-04-03 PROCEDURE — 160046 HCHG PACU - 1ST 60 MINS PHASE II: Performed by: SURGERY

## 2025-04-03 PROCEDURE — 19285 PERQ DEV BREAST 1ST US IMAG: CPT | Mod: RT | Performed by: SURGERY

## 2025-04-03 PROCEDURE — 160039 HCHG SURGERY MINUTES - EA ADDL 1 MIN LEVEL 3: Performed by: SURGERY

## 2025-04-03 PROCEDURE — 700111 HCHG RX REV CODE 636 W/ 250 OVERRIDE (IP): Mod: JZ | Performed by: SURGERY

## 2025-04-03 RX ORDER — LIDOCAINE AND PRILOCAINE 25; 25 MG/G; MG/G
CREAM TOPICAL ONCE
Status: DISCONTINUED | OUTPATIENT
Start: 2025-04-03 | End: 2025-04-03 | Stop reason: HOSPADM

## 2025-04-03 RX ORDER — LIDOCAINE HYDROCHLORIDE 20 MG/ML
INJECTION, SOLUTION EPIDURAL; INFILTRATION; INTRACAUDAL; PERINEURAL PRN
Status: DISCONTINUED | OUTPATIENT
Start: 2025-04-03 | End: 2025-04-03 | Stop reason: SURG

## 2025-04-03 RX ORDER — KETOROLAC TROMETHAMINE 15 MG/ML
INJECTION, SOLUTION INTRAMUSCULAR; INTRAVENOUS PRN
Status: DISCONTINUED | OUTPATIENT
Start: 2025-04-03 | End: 2025-04-03 | Stop reason: SURG

## 2025-04-03 RX ORDER — TRAMADOL HYDROCHLORIDE 50 MG/1
50 TABLET ORAL EVERY 6 HOURS PRN
Qty: 8 TABLET | Refills: 0 | Status: SHIPPED | OUTPATIENT
Start: 2025-04-03 | End: 2025-04-05

## 2025-04-03 RX ORDER — IPRATROPIUM BROMIDE AND ALBUTEROL SULFATE 2.5; .5 MG/3ML; MG/3ML
3 SOLUTION RESPIRATORY (INHALATION)
Status: DISCONTINUED | OUTPATIENT
Start: 2025-04-03 | End: 2025-04-03 | Stop reason: HOSPADM

## 2025-04-03 RX ORDER — SODIUM CHLORIDE, SODIUM LACTATE, POTASSIUM CHLORIDE, CALCIUM CHLORIDE 600; 310; 30; 20 MG/100ML; MG/100ML; MG/100ML; MG/100ML
INJECTION, SOLUTION INTRAVENOUS CONTINUOUS
Status: DISCONTINUED | OUTPATIENT
Start: 2025-04-03 | End: 2025-04-03 | Stop reason: HOSPADM

## 2025-04-03 RX ORDER — MIDAZOLAM HYDROCHLORIDE 1 MG/ML
INJECTION INTRAMUSCULAR; INTRAVENOUS PRN
Status: DISCONTINUED | OUTPATIENT
Start: 2025-04-03 | End: 2025-04-03 | Stop reason: SURG

## 2025-04-03 RX ORDER — ALPRAZOLAM 0.25 MG
.25-.5 TABLET ORAL
Status: DISCONTINUED | OUTPATIENT
Start: 2025-04-03 | End: 2025-04-03 | Stop reason: HOSPADM

## 2025-04-03 RX ORDER — ONDANSETRON 4 MG/1
4 TABLET, FILM COATED ORAL EVERY 8 HOURS PRN
Qty: 9 TABLET | Refills: 0 | Status: SHIPPED | OUTPATIENT
Start: 2025-04-03 | End: 2025-04-06

## 2025-04-03 RX ORDER — DIPHENHYDRAMINE HYDROCHLORIDE 50 MG/ML
25 INJECTION, SOLUTION INTRAMUSCULAR; INTRAVENOUS EVERY 6 HOURS PRN
Status: DISCONTINUED | OUTPATIENT
Start: 2025-04-03 | End: 2025-04-03 | Stop reason: HOSPADM

## 2025-04-03 RX ORDER — BUPIVACAINE HYDROCHLORIDE 2.5 MG/ML
INJECTION, SOLUTION EPIDURAL; INFILTRATION; INTRACAUDAL; PERINEURAL
Status: DISCONTINUED
Start: 2025-04-03 | End: 2025-04-03 | Stop reason: HOSPADM

## 2025-04-03 RX ORDER — OXYCODONE HCL 5 MG/5 ML
10 SOLUTION, ORAL ORAL
Status: DISCONTINUED | OUTPATIENT
Start: 2025-04-03 | End: 2025-04-03 | Stop reason: HOSPADM

## 2025-04-03 RX ORDER — HYDROMORPHONE HYDROCHLORIDE 1 MG/ML
0.5 INJECTION, SOLUTION INTRAMUSCULAR; INTRAVENOUS; SUBCUTANEOUS
Status: DISCONTINUED | OUTPATIENT
Start: 2025-04-03 | End: 2025-04-03 | Stop reason: HOSPADM

## 2025-04-03 RX ORDER — MIDAZOLAM HYDROCHLORIDE 1 MG/ML
1 INJECTION INTRAMUSCULAR; INTRAVENOUS
Status: DISCONTINUED | OUTPATIENT
Start: 2025-04-03 | End: 2025-04-03 | Stop reason: HOSPADM

## 2025-04-03 RX ORDER — ONDANSETRON 2 MG/ML
INJECTION INTRAMUSCULAR; INTRAVENOUS PRN
Status: DISCONTINUED | OUTPATIENT
Start: 2025-04-03 | End: 2025-04-03 | Stop reason: SURG

## 2025-04-03 RX ORDER — HYDROMORPHONE HYDROCHLORIDE 1 MG/ML
0.2 INJECTION, SOLUTION INTRAMUSCULAR; INTRAVENOUS; SUBCUTANEOUS
Status: DISCONTINUED | OUTPATIENT
Start: 2025-04-03 | End: 2025-04-03 | Stop reason: HOSPADM

## 2025-04-03 RX ORDER — DIPHENHYDRAMINE HYDROCHLORIDE 50 MG/ML
12.5 INJECTION, SOLUTION INTRAMUSCULAR; INTRAVENOUS
Status: DISCONTINUED | OUTPATIENT
Start: 2025-04-03 | End: 2025-04-03 | Stop reason: HOSPADM

## 2025-04-03 RX ORDER — MEPERIDINE HYDROCHLORIDE 25 MG/ML
25 INJECTION INTRAMUSCULAR; INTRAVENOUS; SUBCUTANEOUS
Status: DISCONTINUED | OUTPATIENT
Start: 2025-04-03 | End: 2025-04-03 | Stop reason: HOSPADM

## 2025-04-03 RX ORDER — DIPHENHYDRAMINE HCL 25 MG
25 TABLET ORAL EVERY 6 HOURS PRN
Status: DISCONTINUED | OUTPATIENT
Start: 2025-04-03 | End: 2025-04-03 | Stop reason: HOSPADM

## 2025-04-03 RX ORDER — LIDOCAINE HYDROCHLORIDE 10 MG/ML
INJECTION, SOLUTION EPIDURAL; INFILTRATION; INTRACAUDAL; PERINEURAL
Status: DISCONTINUED
Start: 2025-04-03 | End: 2025-04-03 | Stop reason: HOSPADM

## 2025-04-03 RX ORDER — HYDROMORPHONE HYDROCHLORIDE 1 MG/ML
0.1 INJECTION, SOLUTION INTRAMUSCULAR; INTRAVENOUS; SUBCUTANEOUS
Status: DISCONTINUED | OUTPATIENT
Start: 2025-04-03 | End: 2025-04-03 | Stop reason: HOSPADM

## 2025-04-03 RX ORDER — ONDANSETRON 2 MG/ML
4 INJECTION INTRAMUSCULAR; INTRAVENOUS
Status: DISCONTINUED | OUTPATIENT
Start: 2025-04-03 | End: 2025-04-03 | Stop reason: HOSPADM

## 2025-04-03 RX ORDER — BUPIVACAINE HYDROCHLORIDE 2.5 MG/ML
INJECTION, SOLUTION EPIDURAL; INFILTRATION; INTRACAUDAL; PERINEURAL
Status: DISCONTINUED | OUTPATIENT
Start: 2025-04-03 | End: 2025-04-03 | Stop reason: HOSPADM

## 2025-04-03 RX ORDER — OXYCODONE HCL 5 MG/5 ML
5 SOLUTION, ORAL ORAL
Status: DISCONTINUED | OUTPATIENT
Start: 2025-04-03 | End: 2025-04-03 | Stop reason: HOSPADM

## 2025-04-03 RX ORDER — CEFAZOLIN SODIUM 1 G/3ML
INJECTION, POWDER, FOR SOLUTION INTRAMUSCULAR; INTRAVENOUS PRN
Status: DISCONTINUED | OUTPATIENT
Start: 2025-04-03 | End: 2025-04-03 | Stop reason: SURG

## 2025-04-03 RX ORDER — DEXAMETHASONE SODIUM PHOSPHATE 4 MG/ML
INJECTION, SOLUTION INTRA-ARTICULAR; INTRALESIONAL; INTRAMUSCULAR; INTRAVENOUS; SOFT TISSUE PRN
Status: DISCONTINUED | OUTPATIENT
Start: 2025-04-03 | End: 2025-04-03 | Stop reason: SURG

## 2025-04-03 RX ORDER — LIDOCAINE HYDROCHLORIDE 10 MG/ML
INJECTION, SOLUTION EPIDURAL; INFILTRATION; INTRACAUDAL; PERINEURAL
Status: DISCONTINUED | OUTPATIENT
Start: 2025-04-03 | End: 2025-04-03 | Stop reason: HOSPADM

## 2025-04-03 RX ORDER — EPHEDRINE SULFATE 50 MG/ML
INJECTION, SOLUTION INTRAVENOUS PRN
Status: DISCONTINUED | OUTPATIENT
Start: 2025-04-03 | End: 2025-04-03 | Stop reason: SURG

## 2025-04-03 RX ADMIN — FENTANYL CITRATE 100 MCG: 50 INJECTION, SOLUTION INTRAMUSCULAR; INTRAVENOUS at 08:39

## 2025-04-03 RX ADMIN — FENTANYL CITRATE 50 MCG: 50 INJECTION, SOLUTION INTRAMUSCULAR; INTRAVENOUS at 09:33

## 2025-04-03 RX ADMIN — SUGAMMADEX 200 MG: 100 INJECTION, SOLUTION INTRAVENOUS at 09:40

## 2025-04-03 RX ADMIN — PROPOFOL 150 MG: 10 INJECTION, EMULSION INTRAVENOUS at 08:39

## 2025-04-03 RX ADMIN — SODIUM CHLORIDE, POTASSIUM CHLORIDE, SODIUM LACTATE AND CALCIUM CHLORIDE: 600; 310; 30; 20 INJECTION, SOLUTION INTRAVENOUS at 08:49

## 2025-04-03 RX ADMIN — EPHEDRINE SULFATE 10 MG: 50 INJECTION, SOLUTION INTRAVENOUS at 09:40

## 2025-04-03 RX ADMIN — KETOROLAC TROMETHAMINE 15 MG: 15 INJECTION, SOLUTION INTRAMUSCULAR; INTRAVENOUS at 09:46

## 2025-04-03 RX ADMIN — LIDOCAINE HYDROCHLORIDE 40 MG: 20 INJECTION, SOLUTION EPIDURAL; INFILTRATION; INTRACAUDAL; PERINEURAL at 08:39

## 2025-04-03 RX ADMIN — CEFAZOLIN 2 G: 1 INJECTION, POWDER, FOR SOLUTION INTRAMUSCULAR; INTRAVENOUS at 08:34

## 2025-04-03 RX ADMIN — ONDANSETRON 4 MG: 2 INJECTION INTRAMUSCULAR; INTRAVENOUS at 08:50

## 2025-04-03 RX ADMIN — DEXAMETHASONE SODIUM PHOSPHATE 8 MG: 4 INJECTION INTRA-ARTICULAR; INTRALESIONAL; INTRAMUSCULAR; INTRAVENOUS; SOFT TISSUE at 08:50

## 2025-04-03 RX ADMIN — MIDAZOLAM HYDROCHLORIDE 2 MG: 1 INJECTION, SOLUTION INTRAMUSCULAR; INTRAVENOUS at 08:34

## 2025-04-03 RX ADMIN — ROCURONIUM BROMIDE 40 MG: 10 INJECTION INTRAVENOUS at 08:39

## 2025-04-03 RX ADMIN — FENTANYL CITRATE 50 MCG: 50 INJECTION, SOLUTION INTRAMUSCULAR; INTRAVENOUS at 09:04

## 2025-04-03 ASSESSMENT — PAIN DESCRIPTION - PAIN TYPE
TYPE: SURGICAL PAIN

## 2025-04-03 ASSESSMENT — PAIN SCALES - GENERAL: PAIN_LEVEL: 5

## 2025-04-03 NOTE — ANESTHESIA POSTPROCEDURE EVALUATION
Patient: Dinah Christine    Procedure Summary       Date: 04/03/25 Room / Location: [facility] ROOM 27 / SURGERY SAME DAY [facility]    Anesthesia Start: 0834 Anesthesia Stop: 0956    Procedure: INTRA-OPERATIVE WIRE LOCALIZED RIGHT BREAST EXCISION BIOPSY (Right: Breast) Diagnosis: (RIGHT BLOODY NIPPLE DISCHARGE)    Surgeons: Jill Lion M.D. Responsible Provider: Aman Parkinson M.D.    Anesthesia Type: general ASA Status: 1            Final Anesthesia Type: general  Last vitals  BP   Blood Pressure: 107/56    Temp   36.2 °C (97.2 °F)    Pulse   63   Resp   16    SpO2   97 %      Anesthesia Post Evaluation    Patient location during evaluation: PACU  Patient participation: complete - patient participated  Level of consciousness: awake and alert  Pain score: 5    Airway patency: patent  Anesthetic complications: no  Cardiovascular status: hemodynamically stable  Respiratory status: acceptable  Hydration status: euvolemic    PONV: none          No notable events documented.     Nurse Pain Score: 0 (NPRS)

## 2025-04-03 NOTE — OR NURSING
0951: Pt arrived from OR to [facility]. ID verified. Report received from SHAKIRA Guerrero and Dr Parkinson. Connected to monitor. 4L O2 via mask. Pt is awake and alert. Breast binder in place, ABD and fluff noted clean dry and intact.    0957: weaned to room air. Pt states she has some soreness 3/10 right breast, but it is tolerable and does not need anything.  Tolerated sips of water.    1015: called [facility] pharmacy - medications still in process. ETA 20min.    1030: after nap, pt feeling more awake. Stated readiness to get dressed. Phase 1 complete. Mom Gavi called with updates, ETA 30 min - will meet in garage. Pt ambulated to bathroom and able to void.    1045: prescriptions picked up from pharmacy, given to patient. PIV removed with tip intact.     1120: Out to pick-up area via wheelchair accompanied by this RN. Discharge instructions reviewed with mom. Pt discharged home in stable condition. All belongings taken. Rx for tramadol and zofran with patient.

## 2025-04-03 NOTE — ANESTHESIA PREPROCEDURE EVALUATION
Case: [id number] Date/Time: 04/03/25 0845    Procedure: INTRA-OPERATIVE WIRE LOCALIZED RIGHT BREAST EXCISION BIOPSY    Pre-op diagnosis: NIPPLE DISCHARGE    Location: [facility] ROOM 27 / SURGERY SAME DAY [facility]    Surgeons: Jill Lion M.D.            Relevant Problems   CARDIAC   (positive) External hemorrhoids       Physical Exam    Airway   Mallampati: II  TM distance: >3 FB  Neck ROM: full       Cardiovascular - normal exam  Rhythm: regular  Rate: normal  (-) murmur     Dental - normal exam           Pulmonary - normal exam  Breath sounds clear to auscultation     Abdominal    Neurological - normal exam                   Anesthesia Plan    ASA 1       Plan - general       Airway plan will be ETT          Induction: intravenous    Postoperative Plan: Postoperative administration of opioids is intended.    Pertinent diagnostic labs and testing reviewed    Informed Consent:    Anesthetic plan and risks discussed with patient.    Use of blood products discussed with: patient whom consented to blood products.

## 2025-04-03 NOTE — OP REPORT
Patient Name: Dinah Christine   Medical Record Number: [id number]   Date of Service: 4/3/2025    Surgeon: Jill Lewis MD [facility]  Assistant: Reyna Worley PA-C    Operation:  Right breast ultrasound-guided wire localization x2 (bracketing wires)  Right breast wire-localized excisional biopsy  Interpretation of intraoperative specimen radiograph    Preoperative Diagnosis: Right bloody nipple discharge with extensive palpable cystic change.  Postoperative Diagnosis: Same    Anesthesia: General anesthesia via laryngeal mask airway.  Multimodal analgesia was initiated preoperatively by the anesthesia team.    Estimated Blood Loss: 10 mL    Specimen: Right breast 09:00    Complications: none    Operative Findings: Intraoperative specimen radiograph demonstrated excision and capture of the extensive lesions and both localization wires.    Indications: Dinah Christine is a 54 y.o. female who has ongoing nipple discharge, sometimes bloody, with extensive cystic change on imaging.  A thorough discussion of the indications, alternatives, risks, and benefits to excisional biopsy was held with the patient.  All questions were answered in detail.  Informed consent was signed and placed in the chart.    Description of Operation:  The patient was brought into the operating room and placed supine on the operating table.  Preoperative antibiotics were administered and sequential compression devices were placed for venous thromboembolic prophylaxis.  Smooth General anesthesia via laryngeal mask airway was induced.  Once the patient was resting comfortably the 13 MHz linear ultrasound probe was used to identify the radial distribution of the cysts in the right breast at the 9:00 position, 1 - 10 cm from the nipple.  This was clearly marked on the skin.  A preoperative timeout was performed confirming the patient's identity and the nature and location of the operation to be performed.    The right breast was  prepped with alcohol.  Under direct ultrasound visualization a 5 cm Kopans wire was introduced using a modified Seldinger technique from an inferolateral approach to lie just medial to the medial-most area of concern (retroareolar).  The wire was then trimmed.  This procedure was repeated with a second wire to the lateral aspect of the targeted area; this wire was also trimmed.    The breast was then prepped and draped in the usual sterile fashion using chlorhexidine.  The planned incision was marked on the skin in a radial elliptical fashion.  The planned incision and underlying breast tissue were anesthetized with a 1:1 mixture of 1% lidocaine plain and 0.25% bupivicaine plain as a field block.  The incision was then made sharply and continued into the subcutaneous tissue with electrocautery.  The Kopans wires were encountered at the inferior aspect of the incision and were carefully secured and brought into the incision.  The wires were then traced down and the targeted breast tissue was dissected free from the surrounding breast tissue using electrocautery.  The specimen was then oriented using CorrectClips and was submitted for specimen radiograph.  This demonstrated capture of the lesion and both localization wires in their entirety.  The specimen was then submitted to pathology.  The incision was inspected for hemostasis, which was achieved with electrocautery.  The wound was then irrigated thoroughly with warm sterile saline; the effluent was clear.  The breast tissue was then gently reapproximated using interrupted 2-0 Vicryl sutures.  The skin was closed with interrupted 3-0 Monocryl deep dermal sutures and a 4-0 Monocryl running subcuticular suture.  A sterile dressing was applied.    The patient was then awakened from General anesthesia via laryngeal mask airway and was taken to the postanesthesia care unit in stable condition.  At the conclusion of the operation all sponge, needle, and instrument counts  were reported correct x2 by the nursing staff.  Estimated blood loss was 10 mL.  The patient tolerated the procedure well with no immediate complications.  It is anticipated that the patient will be able to be discharged to home when PACU discharge criteria are met.

## 2025-04-03 NOTE — DISCHARGE INSTRUCTIONS
HOME CARE INSTRUCTIONS    ACTIVITY: Rest and take it easy for the first 24 hours.  A responsible adult is recommended to remain with you during that time.  It is normal to feel sleepy.  We encourage you to not do anything that requires balance, judgment or coordination.    FOR 24 HOURS DO NOT:  Drive, operate machinery or run household appliances.  Drink beer or alcoholic beverages.  Make important decisions or sign legal documents.    SPECIAL INSTRUCTIONS: PLEASE REFER TO DR CARRINGTON POST-OP INSTRUCTIONS    DIET: No restrictions, may resume normal diet as tolerated. To avoid nausea, slowly advance diet as tolerated, avoiding spicy or greasy foods for the first day.  Add more substantial food to your diet according to your physician's instructions.  INCREASE FLUIDS AND FIBER TO AVOID CONSTIPATION.    MEDICATIONS: Resume taking daily medication.  Take prescribed pain medication with food.  If no medication is prescribed, you may take non-aspirin pain medication if needed.  PAIN MEDICATION CAN BE VERY CONSTIPATING.  Take a stool softener or laxative such as senokot, pericolace, or milk of magnesia if needed.    Prescription: TRAMADOL (PAIN MEDICATION), ONDANSETRON (ANTI-NAUSEA MEDICATION)    Last pain medication given: NONE  Local anesthetic used right breast. May take pain medication or over-the-counter Tylenol or Ibuprofen when you feel it start to wear off.     A follow-up appointment should be arranged with Dr CARRINGTON; call to schedule.    You should CALL YOUR PHYSICIAN if you develop:  Fever greater than 101 degrees F.  Pain not relieved by medication, or persistent nausea or vomiting.  Excessive bleeding (blood soaking through dressing) or unexpected drainage from the wound.  Extreme redness or swelling around the incision site, drainage of pus or foul smelling drainage.  Inability to urinate or empty your bladder within 8 hours.  Problems with breathing or chest pain.    You should call 911 if you  develop problems with breathing or chest pain.  If you are unable to contact your doctor or surgical center, you should go to the nearest emergency room or urgent care center.      MILD FLU-LIKE SYMPTOMS ARE NORMAL.  YOU MAY EXPERIENCE GENERALIZED MUSCLE ACHES, THROAT IRRITATION, HEADACHE AND/OR SOME NAUSEA.    If any questions arise, call Dr CARRINGTON at [phone]. If your doctor is not available, please feel free to call the Surgical Center at [phone].  The Center is open Monday through Friday from 7AM to 7PM.      A registered nurse may call you a few days after your surgery to see how you are doing after your procedure.    You may also receive a survey in the mail within the next two weeks and we ask that you take a few moments to complete the survey and return it to us.  Our goal is to provide you with very good care and we value your comments.     Depression / Suicide Risk    As you are discharged from this [facility] facility, it is important to learn how to keep safe from harming yourself.    Recognize the warning signs:  Abrupt changes in personality, positive or negative- including increase in energy   Giving away possessions  Change in eating patterns- significant weight changes-  positive or negative  Change in sleeping patterns- unable to sleep or sleeping all the time   Unwillingness or inability to communicate  Depression  Unusual sadness, discouragement and loneliness  Talk of wanting to die  Neglect of personal appearance   Rebelliousness- reckless behavior  Withdrawal from people/activities they love  Confusion- inability to concentrate     If you or a loved one observes any of these behaviors or has concerns about self-harm, here's what you can do:  Talk about it- your feelings and reasons for harming yourself  Remove any means that you might use to hurt yourself (examples: pills, rope, extension cords, firearm)  Get professional help from the community (Mental Health, Substance  Abuse, psychological counseling)  Do not be alone:Call your Safe Contact- someone whom you trust who will be there for you.  Call your local CRISIS HOTLINE 073-6929 or 363-509-2416  Call your local Children's Mobile Crisis Response Team [place] (104) 599-1190 or www.[employer]  Call the toll free National Suicide Prevention Hotlines   National Suicide Prevention Lifeline 340-316-OBUI (1514)  [place] [facility] Line Network 800-SUICIDE (140-7124)    I acknowledge receipt and understanding of these Home Care instructions.

## 2025-04-03 NOTE — ANESTHESIA TIME REPORT
Anesthesia Start and Stop Event Times       Date Time Event    4/3/2025 0822 Ready for Procedure     0834 Anesthesia Start     0956 Anesthesia Stop          Responsible Staff  04/03/25      Name Role Begin End    Aman Parkinson M.D. Anesth 0834 0956          Overtime Reason:  no overtime (within assigned shift)    Comments:

## 2025-04-03 NOTE — ANESTHESIA PROCEDURE NOTES
Airway    Date/Time: 4/3/2025 8:51 AM    Performed by: Aman Parkinson M.D.  Authorized by: Aman Parkinson M.D.    Location:  OR  Urgency:  Elective  Indications for Airway Management:  Anesthesia      Spontaneous Ventilation: absent    Sedation Level:  Deep  Preoxygenated: Yes    Final Airway Type:  Supraglottic airway  Final Supraglottic Airway:  Standard LMA    SGA Size:  3  Number of Attempts at Approach:  1

## 2025-04-04 ENCOUNTER — TELEPHONE (OUTPATIENT)
Dept: SURGERY | Facility: MEDICAL CENTER | Age: 55
End: 2025-04-04
Payer: COMMERCIAL

## 2025-04-04 ENCOUNTER — RESULTS FOLLOW-UP (OUTPATIENT)
Dept: SURGERY | Facility: MEDICAL CENTER | Age: 55
End: 2025-04-04
Payer: COMMERCIAL

## 2025-04-04 NOTE — TELEPHONE ENCOUNTER
MAGDAM to see how Dinah is doing postoperatively. I reminded her of her postop appointment scheduled for 04/10/2025 at 11:40am. I advised her to continue wearing her compression binder to prevent excess swelling, pain, or bruising until her post op appointment. I informed her that she can shower beginning 48 hours after surgery.

## 2025-04-09 PROBLEM — N60.11: Status: ACTIVE | Noted: 2018-05-14

## 2025-04-10 ENCOUNTER — OFFICE VISIT (OUTPATIENT)
Dept: SURGERY | Facility: MEDICAL CENTER | Age: 55
End: 2025-04-10
Payer: COMMERCIAL

## 2025-04-10 VITALS
HEART RATE: 67 BPM | TEMPERATURE: 98.1 F | SYSTOLIC BLOOD PRESSURE: 122 MMHG | WEIGHT: 150 LBS | DIASTOLIC BLOOD PRESSURE: 70 MMHG | OXYGEN SATURATION: 97 % | BODY MASS INDEX: 25.61 KG/M2 | HEIGHT: 64 IN

## 2025-04-10 DIAGNOSIS — N60.11 DUCTAL PAPILLOMATOSIS OF RIGHT BREAST: ICD-10-CM

## 2025-04-10 DIAGNOSIS — Z91.89 AT HIGH RISK FOR BREAST CANCER: ICD-10-CM

## 2025-04-10 DIAGNOSIS — N64.52 BLOODY DISCHARGE FROM RIGHT NIPPLE: ICD-10-CM

## 2025-04-10 PROCEDURE — 99024 POSTOP FOLLOW-UP VISIT: CPT | Performed by: SURGERY

## 2025-04-10 NOTE — PROGRESS NOTES
"    Subjective    Delightful 54F s/p R breast excisional biopsy 04/03/2025, here for postop check and review of pathology.  Since surgery she reports no acute issues.  Functionally, the patient is at baseline.    Pathology:  Extensive intraductal papillomatosis, no atypia, no malignancy.  The patient was given a printed copy of her pathology report and it was discussed with her in detail.    Objective    /70 (BP Location: Left arm, Patient Position: Sitting, BP Cuff Size: Large adult)   Pulse 67   Temp 36.7 °C (98.1 °F) (Temporal)   Ht 1.626 m (5' 4\")   Wt 68 kg (150 lb)   SpO2 97%   BMI 25.75 kg/m²   Gen: Alert, oriented, pleasant, no acute distress  Chest: Respirations unlabored on room air with symmetric expansion  Abd: Soft, nondistended  Ext: Warm, well-perfused  Breasts: Right breast lateral radial incision clean, dry, intact.  No erythema.  No exudate.  No palpable masses.  No palpable fluctuance.  Moderate resolving ecchymosis.  Expected contour with lateral flattening.    [facility]G Performance Status categories: 0= Fully active, able to carry on all pre-disease performance without restriction.    Assessment & Plan    Delightful 54F s/p R breast excisional biopsy for extensive intraductal papillomatosis, overall doing very well postoperatively.  We discussed her pathology results and the next steps in detail.  Upon recalculation of her risk profile, she meets criteria for high risk surveillance and consideration of chemoprevention.  The first step here will be to stop the combined HRT.  I did place a referral to oncology wellness to discuss nonhormonal options for menopausal symptoms; we can consider chemoprevention in the future.  All questions were answered to the patient's satisfaction.   - Referrals: Oncology wellness   - Return to clinic: 1 month wound check   - Next clinical breast exam: 08/2025, 02/2026, 08/2026, 02/2027   - Next breast imaging: B dMMG 02/2026, MRI 08/2026, B sMMG " 02/2027    Problem   At High Risk for Breast Cancer    Lifetime (5-yr) breast cancer risk calculations 04/10/2025:  - Lucaser-Kailyn v7: 20.96% (3.17%)  - Lucaser-Kailyn v8: 21.64% (3.32%)  - Verónica model: 20.57% (2.68%)    High risk surveillance     Bloody Discharge From Right Nipple    Right bloody nipple discharge  - BIRADS 2 MRI 01/2025, sMMG 02/2025  - Extensive cystic change through the right lateral breast with apparent intraluminal solid masses  - Right excisional biopsy 04/03/2025 (Angelique): Extensive intraductal papillomatosis with apocrine metaplasia     Ductal Papillomatosis of Right Breast    Right breast UOQ excisional biopsy 2017 (Tian): Papilloma  Bloody nipple discharge developed 2025, significant masslike change through lateral right breast  Right breast lateral excisional biopsy 04/03/2025 (Angelique): Extensive intraductal papillomas with apocrine metaplasia.

## 2025-04-11 NOTE — Clinical Note
REFERRAL APPROVAL NOTICE         Sent on April 11, 2025                   Dinah Christine  [address] [place] [address]                   Dear Ms. Christine,    After a careful review of the medical information and benefit coverage, [facility] has processed your referral. See below for additional details.    If applicable, you must be actively enrolled with your insurance for coverage of the authorized service. If you have any questions regarding your coverage, please contact your insurance directly.    REFERRAL INFORMATION   Referral #:  [id number]  Referred-To Department    Referred-By Provider:  Surgery    Jill Lion M.D.   [facility]      [address]  [address]  [place] [address]  [phone] [address]  [place] [address] [phone]    Referral Start Date:  04/10/2025  Referral End Date:   04/10/2026             SCHEDULING  If you do not already have an appointment, please call [phone] to make an appointment.     MORE INFORMATION  If you do not already have a [employer] account, sign up at: [email or web address]  You can access your medical information, make appointments, see lab results, billing information, and more.  If you have questions regarding this referral, please contact  the [facility] Referrals department at:             [phone]. Monday - Friday 8:00AM - 5:00PM.     Sincerely,    [facility]

## 2025-04-14 ENCOUNTER — TELEPHONE (OUTPATIENT)
Dept: SURGERY | Facility: MEDICAL CENTER | Age: 55
End: 2025-04-14
Payer: COMMERCIAL

## 2025-04-17 ENCOUNTER — OFFICE VISIT (OUTPATIENT)
Dept: SURGERY | Facility: MEDICAL CENTER | Age: 55
End: 2025-04-17
Payer: COMMERCIAL

## 2025-04-17 VITALS
OXYGEN SATURATION: 98 % | WEIGHT: 149.4 LBS | HEART RATE: 87 BPM | BODY MASS INDEX: 25.51 KG/M2 | SYSTOLIC BLOOD PRESSURE: 125 MMHG | HEIGHT: 64 IN | DIASTOLIC BLOOD PRESSURE: 75 MMHG | TEMPERATURE: 98.3 F

## 2025-04-17 DIAGNOSIS — N95.1 MENOPAUSAL SYMPTOMS: ICD-10-CM

## 2025-04-17 DIAGNOSIS — N60.11 DUCTAL PAPILLOMATOSIS OF RIGHT BREAST: ICD-10-CM

## 2025-04-17 DIAGNOSIS — Z91.89 AT HIGH RISK FOR BREAST CANCER: ICD-10-CM

## 2025-04-17 PROCEDURE — 3074F SYST BP LT 130 MM HG: CPT | Performed by: OBSTETRICS & GYNECOLOGY

## 2025-04-17 PROCEDURE — 99215 OFFICE O/P EST HI 40 MIN: CPT | Performed by: OBSTETRICS & GYNECOLOGY

## 2025-04-17 PROCEDURE — 99417 PROLNG OP E/M EACH 15 MIN: CPT | Performed by: OBSTETRICS & GYNECOLOGY

## 2025-04-17 PROCEDURE — 3078F DIAST BP <80 MM HG: CPT | Performed by: OBSTETRICS & GYNECOLOGY

## 2025-04-17 NOTE — PROGRESS NOTES
Primary Care Provider Crytsal Rosen P.A.-C.   Referring Provider: Jill Lewis MD   Surgical Oncologist: Jill Lewis MD       HPI:  55 yo     Patient referred for wellness / lifestyle medicine care after recent diagnosis of extensive intraductal papillomatosis   At High Risk for Breast Cancer     Lifetime (5-yr) breast cancer risk calculations 04/10/2025:  - Lucaser-Cuzick v7: 20.96% (3.17%)  - Lucaser-Cuzick v8: 21.64% (3.32%)  - Verónica model: 20.57% (2.68%)     High risk surveillance     Referred for management of menopause symptoms     Comorbidities: BMI 25       This very delightful patient presents today to discuss management of menopausal symptoms after seeing Dr. Merino and being diagnosed as a high risk for breast cancer.  She states that she was started on a compounded estrogen and progesterone cream through a company called [employer].  This was a bioidentical product that she started in 2025.  She states before starting it she was noticing mood changes, anger issues, impatience with her family.  Felt down and depressed as well as had a very low energy level and felt exhausted all the time.  She states after starting that she immediately felt better and continued to feel better until last week when it was discontinued.  She denies hot flashes she states that she was occasionally warm at night but that was not the predominant symptom.  She denies any vaginal dryness or sexual dysfunction.  She did have some brain fog and achiness but started using some alejandra turmeric and that has improved.  She also struggles with her sleep.  She states that when she was on the hormones her sleep was significantly better but now is struggling again with some insomnia.    She states her mother had breast cancer at the age of 72 and her grandmother had it at 97 and she was recently diagnosed with extensive intraductal papillomatosis.    She states her nutrition overall is pretty good  "although she does have quite a bit of packaged processed food including Gatorade, Triscuits, tortilla chips, candy such as Snickers periodically.  Typical day involves a very protein heavy diet.  Consisting of eggs and meat and cheese with not a lot of fruits or vegetables.    From an exercise standpoint she states that she does not intentionally exercise but is very very active for work.    She does not smoke she does not use recreational drugs she will have up to 6 alcoholic beverages per week.  She states this is not every week but on some weeks she will go \"grab a beer\" with a friend or drink socially most nights of the week but then other weeks she will not.      Dinah Christine has a current medication list which includes the following prescription(s): calcium-vitamin d-vitamin k and progesterone.     Patient Active Problem List   Diagnosis    Arthralgia of hip    Dense breasts    Constipation    Contact dermatitis due to plant    External hemorrhoids    Ganglion of wrist    Nevus, non-neoplastic    Ductal papillomatosis of right breast    Vitamin D deficiency    Bloody discharge from right nipple    At high risk for breast cancer        Past Surgical History:   Procedure Laterality Date    BREAST BIOPSY Right 4/3/2025    Procedure: INTRA-OPERATIVE WIRE LOCALIZED RIGHT BREAST EXCISION BIOPSY;  Surgeon: Jill Lion M.D.;  Location: SURGERY SAME DAY [facility];  Service: General    LUMPECTOMY Right 2000    OTHER ORTHOPEDIC SURGERY Right 1990    shoulder        Social History     Tobacco Use    Smoking status: Former     Current packs/day: 0.00     Types: Cigarettes    Smokeless tobacco: Never   Vaping Use    Vaping status: Never Used   Substance Use Topics    Alcohol use: Yes     Alcohol/week: 3.0 oz     Types: 2 Cans of beer, 3 Standard drinks or equivalent per week     Comment: 3 drinks per week    Drug use: Never        Family History   Problem Relation Age of Onset    Breast Cancer " Mother     Breast Cancer Maternal Grandmother         Dinah Christine has no known allergies.     Vitals:    04/17/25 1417   BP: 125/75   Pulse: 87   Temp: 36.8 °C (98.3 °F)   SpO2: 98%     Body mass index is 25.64 kg/m².    Physical Exam  Vitals reviewed.   Constitutional:       Appearance: Normal appearance.   Skin:     General: Skin is warm.   Neurological:      General: No focal deficit present.      Mental Status: She is alert and oriented to person, place, and time.   Psychiatric:         Mood and Affect: Mood normal.         Behavior: Behavior normal.         Thought Content: Thought content normal.          1. Ductal papillomatosis of right breast    2. At high risk for breast cancer    3. Menopausal symptoms       We reviewed the goal of the oncology wellness clinic is to assist her in optimizing health, sense of well-being, reducing recurrence risk, and improving quality of life after a cancer diagnosis. In the situation of those without a cancer diagnosis but at high risk, our focus is on lifestyle focused prevention strategies. We identified the following areas which are currently identified as priorities to work on by this patient:     optimizing sleep and energy levels and mental and emotional health     Our current plan includes:   Patient was given my packet of lifestyle resources, including nutrition and physical activity information , The benefits of a more plant-forward diet, focused on a wide variety of fruits, vegetables, legumes, nuts, seeds, and lean proteins was explained. , The patient and I discussed a goal of incorporating more fiber as well as diversity, slowly increasing to 25-30 grams a day. There is also benefit to a wide diversity of fiber sources. , The patient will work toward 5-8 servings of fruits/ vegetables per day, which helps also meet fiber goals. , and The patient will try to minimize added sugar to less than 25 grams / day.     Spent most of the visit really  focused on the patient's menopausal symptoms which she notes really predominantly are the mood changes and a lack of energy.  Would be the priority to fix.  I explained that many of our off label medications we use for nonhormonal management of menopausal symptoms are really geared towards the vasomotor symptoms such as hot flashes and night sweats which she is not having.  Sure which can help with some of the mood changes associated with menopause potentially  We discussed the role of exercise to try to improve her energy level  We discussed the importance of exercise and nutrition and trying to minimize her increased risk of breast cancer  We discussed magnesium with Mahsa  to try to improve sleep as well as good sleep hygiene strategies which are on my handout which I gave her.  Also reviewed the role of acupuncture for menopause related insomnia as well  We discussed that the use of menopause hormone therapy is all about a risk versus benefit analysis year after year.  I always recommend that people try nonhormonal mechanisms and strategies to try to manage the menopausal symptoms but ultimately there are times where the patient ultimately decides that the benefits to their quality of life are worth the risk.  She understands this and wishes to try to find other ways to manage.  The other thing I brought up as far as an off label use of a pharmaceutical would be the use of bupropion.  This would provide some mood stabilization, potentially some energy effects as well as sometimes some improvement of the brain fog.  She hopes to try to get by without using any other prescription medications but is aware that this would be an option in the future if desired.  She is also going to try to minimize alcohol and make it a little more intentional knowing that this can also improve energy levels, brain fog, menopausal symptoms.  Is overall better for her risk reduction of cancer.  New line I will plan to see the  patient back in about 8 weeks to assess how she is doing although I did let her know because she comes from far away that if she is doing well I do not necessarily have to follow-up with her and she can cancel that appointment.    Total time spent today, including personal review of past history, face to face time, chart documentation, and  was 86  minutes.

## 2025-04-30 ENCOUNTER — APPOINTMENT (OUTPATIENT)
Dept: URBAN - METROPOLITAN AREA CLINIC 6 | Facility: CLINIC | Age: 55
Setting detail: DERMATOLOGY
End: 2025-04-30

## 2025-04-30 DIAGNOSIS — L81.4 OTHER MELANIN HYPERPIGMENTATION: ICD-10-CM

## 2025-04-30 DIAGNOSIS — L82.1 OTHER SEBORRHEIC KERATOSIS: ICD-10-CM

## 2025-04-30 DIAGNOSIS — D18.0 HEMANGIOMA: ICD-10-CM

## 2025-04-30 DIAGNOSIS — D22 MELANOCYTIC NEVI: ICD-10-CM | Status: STABLE

## 2025-04-30 DIAGNOSIS — L57.8 OTHER SKIN CHANGES DUE TO CHRONIC EXPOSURE TO NONIONIZING RADIATION: ICD-10-CM

## 2025-04-30 DIAGNOSIS — Z71.89 OTHER SPECIFIED COUNSELING: ICD-10-CM

## 2025-04-30 DIAGNOSIS — D22 MELANOCYTIC NEVI: ICD-10-CM

## 2025-04-30 DIAGNOSIS — L57.0 ACTINIC KERATOSIS: ICD-10-CM

## 2025-04-30 PROBLEM — D18.01 HEMANGIOMA OF SKIN AND SUBCUTANEOUS TISSUE: Status: ACTIVE | Noted: 2025-04-30

## 2025-04-30 PROBLEM — D22.5 MELANOCYTIC NEVI OF TRUNK: Status: ACTIVE | Noted: 2025-04-30

## 2025-04-30 PROBLEM — D22.71 MELANOCYTIC NEVI OF RIGHT LOWER LIMB, INCLUDING HIP: Status: ACTIVE | Noted: 2025-04-30

## 2025-04-30 PROCEDURE — 17003 DESTRUCT PREMALG LES 2-14: CPT

## 2025-04-30 PROCEDURE — ? PRESCRIPTION MEDICATION MANAGEMENT

## 2025-04-30 PROCEDURE — ? PHOTO-DOCUMENTATION

## 2025-04-30 PROCEDURE — ? COUNSELING

## 2025-04-30 PROCEDURE — ? ADDITIONAL NOTES

## 2025-04-30 PROCEDURE — ? SUNSCREEN RECOMMENDATIONS

## 2025-04-30 PROCEDURE — ? LIQUID NITROGEN

## 2025-04-30 PROCEDURE — ? PRESCRIPTION

## 2025-04-30 PROCEDURE — 99213 OFFICE O/P EST LOW 20 MIN: CPT | Mod: 25

## 2025-04-30 PROCEDURE — 17000 DESTRUCT PREMALG LESION: CPT

## 2025-04-30 RX ORDER — TRETIONIN 0.25 MG/G
CREAM TOPICAL
Qty: 45 | Refills: 4 | Status: ERX | COMMUNITY
Start: 2025-04-30

## 2025-04-30 RX ADMIN — TRETIONIN: 0.25 CREAM TOPICAL at 00:00

## 2025-04-30 ASSESSMENT — LOCATION ZONE DERM
LOCATION ZONE: TRUNK
LOCATION ZONE: FEET
LOCATION ZONE: NOSE
LOCATION ZONE: FACE

## 2025-04-30 ASSESSMENT — LOCATION SIMPLE DESCRIPTION DERM
LOCATION SIMPLE: CHEST
LOCATION SIMPLE: LEFT UPPER BACK
LOCATION SIMPLE: NOSE
LOCATION SIMPLE: UPPER BACK
LOCATION SIMPLE: RIGHT FOREHEAD
LOCATION SIMPLE: LEFT FOREHEAD
LOCATION SIMPLE: RIGHT UPPER BACK
LOCATION SIMPLE: RIGHT PLANTAR SURFACE

## 2025-04-30 ASSESSMENT — LOCATION DETAILED DESCRIPTION DERM
LOCATION DETAILED: RIGHT MEDIAL PLANTAR MIDFOOT
LOCATION DETAILED: SUPERIOR THORACIC SPINE
LOCATION DETAILED: LEFT NASAL DORSUM
LOCATION DETAILED: RIGHT SUPERIOR FOREHEAD
LOCATION DETAILED: RIGHT LATERAL SUPERIOR CHEST
LOCATION DETAILED: LEFT SUPERIOR MEDIAL UPPER BACK
LOCATION DETAILED: LEFT INFERIOR MEDIAL FOREHEAD
LOCATION DETAILED: INFERIOR THORACIC SPINE
LOCATION DETAILED: RIGHT INFERIOR UPPER BACK

## 2025-04-30 NOTE — PROCEDURE: PRESCRIPTION MEDICATION MANAGEMENT
Render In Strict Bullet Format?: No
Detail Level: Zone
Initiate Treatment: Tretinoin 0.025 % topical cream

## 2025-04-30 NOTE — PROCEDURE: COUNSELING
Detail Level: Detailed
Detail Level: Zone
Detail Level: Generalized
Sunscreen Recommendations: Recommended broad spectrum inorganic or physical sunscreens primarily containing zinc oxide or titanium dioxide.

## 2025-04-30 NOTE — PROCEDURE: PHOTO-DOCUMENTATION
Photo Preface (Leave Blank If You Do Not Want): Photographs
Details (Free Text): Nevus on right superior chest remains stable when compared to previous clinical photos. Will plan on rechecking at follow up visit.
Detail Level: Detailed

## 2025-04-30 NOTE — PROCEDURE: LIQUID NITROGEN
Consent: The patient's verbal consent was obtained including but not limited to risks of crusting, scabbing, blistering, scarring, darker or lighter pigmentary change, recurrence, incomplete removal and infection.
Detail Level: Zone
Render Post-Care Instructions In Note?: no
Show Aperture Variable?: Yes
Duration Of Freeze Thaw-Cycle (Seconds): 8
Post-Care Instructions: I reviewed with the patient in detail post-care instructions. Patient is to wear sunprotection, and avoid picking at any of the treated lesions. Pt may apply Vaseline to crusted or scabbing areas.
Number Of Freeze-Thaw Cycles: 2 freeze-thaw cycles

## 2025-05-08 ENCOUNTER — OFFICE VISIT (OUTPATIENT)
Dept: SURGERY | Facility: MEDICAL CENTER | Age: 55
End: 2025-05-08
Payer: COMMERCIAL

## 2025-05-08 VITALS
DIASTOLIC BLOOD PRESSURE: 90 MMHG | WEIGHT: 149 LBS | BODY MASS INDEX: 25.44 KG/M2 | OXYGEN SATURATION: 94 % | TEMPERATURE: 98.2 F | HEART RATE: 70 BPM | SYSTOLIC BLOOD PRESSURE: 126 MMHG | HEIGHT: 64 IN

## 2025-05-08 DIAGNOSIS — N60.11 DUCTAL PAPILLOMATOSIS OF RIGHT BREAST: ICD-10-CM

## 2025-05-08 DIAGNOSIS — Z91.89 AT HIGH RISK FOR BREAST CANCER: ICD-10-CM

## 2025-05-08 PROCEDURE — 99024 POSTOP FOLLOW-UP VISIT: CPT | Performed by: SPECIALIST

## 2025-05-08 NOTE — PROGRESS NOTES
"    SUBJECTIVE:   Dinah Christine is a delightful 55 y.o. female who presents in follow up post operative visit.  She is status post right breast excisional biopsy on 4/3/2025 with pathology results demonstrating extensive intraductal papillomatosis, no atypia or malignancy. There are no changes in her functional status and she is overall doing well.  She denies any fevers or chills. She continues to wear a compression bra. She only required Tramadol for 2 day and is satisfied with her cosmesis.     OBJECTIVE:  BP (!) 126/90 (BP Location: Left arm, Patient Position: Sitting, BP Cuff Size: Large adult)   Pulse 70   Temp 36.8 °C (98.2 °F) (Temporal)   Ht 1.626 m (5' 4\")   Wt 67.6 kg (149 lb)   SpO2 94%   BMI 25.58 kg/m²    General: Alert, oriented, pleasant, no acute distress.  HEENT: Extraocular movements intact, no scleral icterus or conjunctival injection.  Chest: Respirations unlabored on room air.  Abdomen: Soft, nondistended.  Extremities: Warm, well-perfused.  Breasts: Right breast lateral radial incision clean, dry, intact.  No erythema.  No exudate.  No palpable masses.  No palpable fluctuance.  Moderate resolving ecchymosis.  Expected contour with lateral flattening.       [facility] ECOG Performance Status categories: 0= Fully active, able to carry on all pre-disease performance without restriction.      ASSESSMENT AND PLAN:  Delightful 55 y.o. s/p R breast excisional biopsy for extensive intraductal papillomatosis, overall doing very well postoperatively. I have given her a copy of her risk calculation. She does not qualify based on NCCN  testing criteria for high-penetrance breast cancer susceptibility but she is interested in [facility]. She informs me she has had prior genetic testing and will try to find her previous results. I have given her the NCCN for residual lifetime risk greater or equal to 20%. She will be due for a clinical breast examination. All questions were answered to her " satisfaction.    - Referrals: [facility]   - Next clinical breast exam: 08/2025, 02/2026, 08/2026, 02/2027   - Next breast imaging: B dMMG 02/2026, MRI 08/2026, B sMMG 02/2027    Problem   At High Risk for Breast Cancer    Lifetime (5-yr) breast cancer risk calculations 04/10/2025:  - Kristin-Kailyn v7: 20.96% (3.17%)  - Kristin-Kailyn v8: 21.64% (3.32%)  - Verónica model: 20.57% (2.68%)    High risk surveillance  Plan for clinical breast examination 8/2025  Next breast immaging: B dMMG 2/2026

## (undated) DEVICE — TOWEL STOP TIMEOUT SAFETY FLAG (40EA/CA)

## (undated) DEVICE — ELECTRODE DUAL RETURN W/ CORD - (50/PK)

## (undated) DEVICE — GAUZE FLUFF STERILE 2-PLY 36 X 36 (100EA/CA)

## (undated) DEVICE — SENSOR OXIMETER ADULT SPO2 RD SET (20EA/BX)

## (undated) DEVICE — TUBING CLEARLINK DUO-VENT - C-FLO (48EA/CA)

## (undated) DEVICE — SET LEADWIRE 5 LEAD BEDSIDE DISPOSABLE ECG (1SET OF 5/EA)

## (undated) DEVICE — SUTURE GENERAL

## (undated) DEVICE — MASK AIRWAY SIZE 3 UNIQUE SILICON (10/BX)

## (undated) DEVICE — TUBE CONNECTING SUCTION - CLEAR PLASTIC STERILE 72 IN (50EA/CA)

## (undated) DEVICE — LACTATED RINGERS INJ 1000 ML - (14EA/CA 60CA/PF)

## (undated) DEVICE — DRAPE MAGNETIC (INSTRA-MAG) - (30/CA)

## (undated) DEVICE — PEN SKIN MARKER W/RULER - (50EA/BX)

## (undated) DEVICE — SUCTION INSTRUMENT YANKAUER BULBOUS TIP W/O VENT (50EA/CA)

## (undated) DEVICE — PACK BREAST RECONSTRUCTION (2EA/CA)

## (undated) DEVICE — CANISTER SUCTION RIGID RED 1500CC (40EA/CA)

## (undated) DEVICE — COVER LIGHT HANDLE FLEXIBLE - SOFT (2EA/PK 80PK/CA)

## (undated) DEVICE — SLEEVE VASO DVT COMPRESSION CALF MED - (10PR/CA)

## (undated) DEVICE — SUTURE 3-0 MONOCRYL PLUS PS-2 - (12/BX)

## (undated) DEVICE — SYRINGE 10 ML CONTROL LL (25EA/BX 4BX/CA)

## (undated) DEVICE — CANISTER SUCTION 3000ML MECHANICAL FILTER AUTO SHUTOFF MEDI-VAC NONSTERILE LF DISP (40EA/CA)

## (undated) DEVICE — GLOVE SZ 6.5 BIOGEL PI MICRO - PF LF (50PR/BX)

## (undated) DEVICE — GLOVE BIOGEL SZ 7.5 SURGICAL PF LTX - (50PR/BX 4BX/CA)

## (undated) DEVICE — SUTURE MONOCRYL PLUS UD 27IN(70CM) USP3-0(M2) S/A PS-2 PRM MP (36PK/BX)

## (undated) DEVICE — GLOVE BIOGEL SZ 6 PF LATEX - (50EA/BX 4BX/CA)

## (undated) DEVICE — CLOSURE SKIN STRIP 1/2 X 4 IN - (STERI STRIP) (50/BX 4BX/CA)

## (undated) DEVICE — SUTURE 4-0 MONOCRYL PLUS PS-2 - 27 INCH (36/BX)

## (undated) DEVICE — PLUMEPEN ULTRA 3/8 IN X 10 FT HOSE (20EA/CA)

## (undated) DEVICE — CHLORAPREP 3 ML APPLICATOR - (25/BX 4BX/CS 100/CS)

## (undated) DEVICE — NEEDLE LOC 5CM 20GA KOPAN BREAST (10EA/BX)

## (undated) DEVICE — CHLORAPREP 26 ML APPLICATOR - ORANGE TINT(25/CA)

## (undated) DEVICE — GOWN SURGEONS LARGE - (32/CA)

## (undated) DEVICE — MASK OXYGEN VNYL ADLT MED CONC WITH 7 FOOT TUBING - (50EA/CA)

## (undated) DEVICE — GLOVE BIOGEL PI INDICATOR SZ 6.0 SURGICAL PF LF -(200PR/CA)

## (undated) DEVICE — SUTURE 2-0 VICRYL PLUS SH - 27 INCH (36/BX)

## (undated) DEVICE — CANNULA O2 COMFORT SOFT EAR ADULT 7 FT TUBING (50/CA)

## (undated) DEVICE — GLOVE BIOGEL SZ 7 SURGICAL PF LTX - (50PR/BX 4BX/CA)

## (undated) DEVICE — GOWN WARMING STANDARD FLEX - (30/CA)

## (undated) DEVICE — KIT  I.V. START (100EA/CA)

## (undated) DEVICE — MARKER CORRECT CLIPS (1EA) - MIN ORDER OF 24 EA MUST BE INCREMENTS OF 24

## (undated) DEVICE — WATER IRRIGATION STERILE 1000ML (12EA/CA)

## (undated) DEVICE — SODIUM CHL IRRIGATION 0.9% 1000ML (12EA/CA)

## (undated) DEVICE — BINDER BREAST FLORAL PINK MEDIUM 34-36 (1EA)"